# Patient Record
Sex: FEMALE | Race: WHITE | NOT HISPANIC OR LATINO | Employment: PART TIME | URBAN - METROPOLITAN AREA
[De-identification: names, ages, dates, MRNs, and addresses within clinical notes are randomized per-mention and may not be internally consistent; named-entity substitution may affect disease eponyms.]

---

## 2015-11-05 LAB
EXTERNAL HIV CONFIRMATION: NORMAL
EXTERNAL HIV SCREEN: NORMAL
HCV AB SER-ACNC: NEGATIVE

## 2017-10-21 ENCOUNTER — APPOINTMENT (OUTPATIENT)
Dept: URGENT CARE | Facility: MEDICAL CENTER | Age: 27
End: 2017-10-21

## 2017-10-21 ENCOUNTER — APPOINTMENT (OUTPATIENT)
Dept: LAB | Facility: MEDICAL CENTER | Age: 27
End: 2017-10-21
Attending: FAMILY MEDICINE

## 2017-10-21 ENCOUNTER — TRANSCRIBE ORDERS (OUTPATIENT)
Dept: URGENT CARE | Facility: MEDICAL CENTER | Age: 27
End: 2017-10-21

## 2017-10-21 DIAGNOSIS — Z02.1 PHYSICAL EXAM, PRE-EMPLOYMENT: ICD-10-CM

## 2017-10-21 DIAGNOSIS — Z02.1 PHYSICAL EXAM, PRE-EMPLOYMENT: Primary | ICD-10-CM

## 2017-10-21 LAB — RUBV IGG SERPL IA-ACNC: 83.3 IU/ML

## 2017-10-21 PROCEDURE — 36415 COLL VENOUS BLD VENIPUNCTURE: CPT

## 2017-10-21 PROCEDURE — 86480 TB TEST CELL IMMUN MEASURE: CPT

## 2017-10-21 PROCEDURE — 86762 RUBELLA ANTIBODY: CPT

## 2017-10-24 LAB
ANNOTATION COMMENT IMP: NORMAL
GAMMA INTERFERON BACKGROUND BLD IA-ACNC: 0.05 IU/ML
M TB IFN-G BLD-IMP: NEGATIVE
M TB IFN-G CD4+ BCKGRND COR BLD-ACNC: 0 IU/ML
M TB IFN-G CD4+ T-CELLS BLD-ACNC: 0.05 IU/ML
MITOGEN IGNF BLD-ACNC: >10 IU/ML
QUANTIFERON-TB GOLD IN TUBE: NORMAL
SERVICE CMNT-IMP: NORMAL

## 2018-07-03 ENCOUNTER — OFFICE VISIT (OUTPATIENT)
Dept: FAMILY MEDICINE | Facility: CLINIC | Age: 28
End: 2018-07-03
Payer: COMMERCIAL

## 2018-07-03 VITALS
SYSTOLIC BLOOD PRESSURE: 122 MMHG | WEIGHT: 157.2 LBS | BODY MASS INDEX: 28.93 KG/M2 | DIASTOLIC BLOOD PRESSURE: 81 MMHG | HEIGHT: 62 IN | TEMPERATURE: 97.9 F | HEART RATE: 86 BPM | OXYGEN SATURATION: 98 %

## 2018-07-03 DIAGNOSIS — R41.840 POOR CONCENTRATION: ICD-10-CM

## 2018-07-03 DIAGNOSIS — F41.9 ANXIETY AND DEPRESSION: Primary | ICD-10-CM

## 2018-07-03 DIAGNOSIS — F32.A ANXIETY AND DEPRESSION: Primary | ICD-10-CM

## 2018-07-03 PROCEDURE — 99202 OFFICE O/P NEW SF 15 MIN: CPT | Performed by: FAMILY MEDICINE

## 2018-07-03 RX ORDER — FLUOXETINE HYDROCHLORIDE 20 MG/1
40 CAPSULE ORAL
Qty: 180 CAPSULE | Refills: 1 | Status: SHIPPED | OUTPATIENT
Start: 2018-07-03 | End: 2019-02-06 | Stop reason: SDUPTHER

## 2018-07-03 RX ORDER — FLUOXETINE HYDROCHLORIDE 20 MG/1
40 CAPSULE ORAL
Refills: 0 | COMMUNITY
Start: 2018-04-04 | End: 2018-07-03 | Stop reason: SDUPTHER

## 2018-07-03 ASSESSMENT — ENCOUNTER SYMPTOMS
DIARRHEA: 0
NAUSEA: 0
PALPITATIONS: 0
VOMITING: 0
DYSPHORIC MOOD: 1
NERVOUS/ANXIOUS: 1
SHORTNESS OF BREATH: 0
ABDOMINAL PAIN: 0

## 2018-07-03 NOTE — PROGRESS NOTES
"   [place] [id number]  [phone]       Reason for visit:   Chief Complaint   Patient presents with   • discuss medications      HPI   Pretty Wood is a 27 y.o. female who presents with new patient med questions   - Anxiety/Depression  Takes Prozac mostly for depression  X 4 years  Has been taking Prozac 40 3-4 months ago  Overall feels OK  Lasts 2 months \"have been a little rough\"  Has never been on any other meds in the past  Had been seeing Psychologist before she moved 4 months ago  Denies any side effects  - ADHD ?s  Notes trouble focusing  Trouble getting things done  Contributing to her mental health  Started a new job within the last 3 mo - taking work home with her     Past Medical History:   Diagnosis Date   • Depression    • Hypothyroidism      Past Surgical History:   Procedure Laterality Date   • OOPHORECTOMY Left    • WISDOM TOOTH EXTRACTION       Social History     Social History   • Marital status: Unknown     Spouse name: N/A   • Number of children: N/A   • Years of education: N/A     Occupational History   • Not on file.     Social History Main Topics   • Smoking status: Never Smoker   • Smokeless tobacco: Never Used   • Alcohol use Yes      Comment: socially   • Drug use: Unknown   • Sexual activity: Not on file     Other Topics Concern   • Not on file     Social History Narrative    Do you wear your seatbelt? Yes    Do you have smoke detector in your home? Yes    Do you have a carbon monoxide detector in your home? 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 single         Family History   Problem Relation Age of Onset   • Hypertension Mother      Bactrim [sulfamethoxazole-trimethoprim]  Current Outpatient Prescriptions   Medication Sig Dispense Refill   • FLUoxetine (PROzac) 20 mg capsule Take 2 capsules (40 mg total) by mouth once daily. 180 capsule 1     No current facility-administered " medications for this visit.        Review of Systems   Respiratory: Negative for shortness of breath.    Cardiovascular: Negative for chest pain and palpitations.   Gastrointestinal: Negative for abdominal pain, diarrhea, nausea and vomiting.   Psychiatric/Behavioral: Positive for dysphoric mood. The patient is nervous/anxious.      Objective   Vitals:    07/03/18 0951   BP: 122/81   Pulse: 86   Temp: 36.6 °C (97.9 °F)   SpO2: 98%       Physical Exam   Constitutional: She is oriented to person, place, and time. She appears well-developed and well-nourished.   Cardiovascular: Normal rate, regular rhythm, normal heart sounds and intact distal pulses.  Exam reveals no gallop and no friction rub.    No murmur heard.  Pulmonary/Chest: Effort normal and breath sounds normal. No respiratory distress. She has no wheezes. She has no rales. She exhibits no tenderness.   Musculoskeletal: She exhibits no tenderness.   Neurological: She is alert and oriented to person, place, and time.   Vitals reviewed.      Procedures    No results found for: WBC, HGB, HCT, PLT, CHOL, TRIG, HDL, LDLDIRECT, ALT, AST, NA, K, CL, CREATININE, BUN, CO2, TSH, PSA, INR, HGBA1C, MICROALBUR      Assessment   Problem List Items Addressed This Visit     Anxiety and depression - Primary     New Problem  X years  Has been on Prozac for the last 4 years  Dose increased 3 months ago  Doing well  Concentration and completing tasks concerns her  No side effects  Continue with Prozac         Relevant Medications    FLUoxetine (PROzac) 20 mg capsule      Other Visit Diagnoses     Poor concentration        New Problem  x few months  New job  Trouble completing tasks  Trouble getting things done before end of day  Would like ADD/ADHD eval  Ref to Tricia Raya MD  7/3/2018

## 2018-07-03 NOTE — ASSESSMENT & PLAN NOTE
New Problem  X years  Has been on Prozac for the last 4 years  Dose increased 3 months ago  Doing well  Concentration and completing tasks concerns her  No side effects  Continue with Prozac

## 2018-07-03 NOTE — PATIENT INSTRUCTIONS
Med refilled and sent to you pharmacy  Schedule appt with Dr Ellison or her associate for evaluation  Come back in the next month for a physical

## 2018-07-03 NOTE — PROGRESS NOTES
[place] [address]  [phone]       Reason for visit:   Chief Complaint   Patient presents with   • discuss medications      HPI   Pretty Wood is a 27 y.o. female who presents with CPX   Updates {YES/NO:200010}    Diet -   Exercise -     Smoke {YES/NO:200010}  Alcohol {YES/NO:200010}  Drugs {YES/NO:200010}    Lives with   Work  Pap Smear Due {YES/NO:200010}    Hep A Due {YES/NO:200010}  TDAP Due {YES/NO:200010}  Flu Due {YES/NO:200010}  Lipids Due {YES/NO:200010}  STDs Due {YES/NO:200010}   History reviewed. No pertinent past medical history.  History reviewed. No pertinent surgical history.  Social History     Social History   • Marital status: Unknown     Spouse name: N/A   • Number of children: N/A   • Years of education: N/A     Occupational History   • Not on file.     Social History Main Topics   • Smoking status: Not on file   • Smokeless tobacco: Not on file   • Alcohol use Not on file   • Drug use: Unknown   • Sexual activity: Not on file     Other Topics Concern   • Not on file     Social History Narrative   • No narrative on file     History reviewed. No pertinent family history.  Bactrim [sulfamethoxazole-trimethoprim]  Current Outpatient Prescriptions   Medication Sig Dispense Refill   • FLUoxetine (PROzac) 20 mg capsule Take 40 mg by mouth once daily.  0     No current facility-administered medications for this visit.        Review of Systems  Objective   Vitals:    07/03/18 0951   BP: 122/81   Pulse: 86   Temp: 36.6 °C (97.9 °F)   SpO2: 98%       Physical Exam    Procedures    No results found for: WBC, HGB, HCT, PLT, CHOL, TRIG, HDL, LDLDIRECT, ALT, AST, NA, K, CL, CREATININE, BUN, CO2, TSH, PSA, INR, HGBA1C, MICROALBUR      Assessment   Problem List Items Addressed This Visit     None              Heladio Raya MD  7/3/2018

## 2018-08-07 ENCOUNTER — OFFICE VISIT (OUTPATIENT)
Dept: FAMILY MEDICINE | Facility: CLINIC | Age: 28
End: 2018-08-07
Payer: COMMERCIAL

## 2018-08-07 VITALS
SYSTOLIC BLOOD PRESSURE: 114 MMHG | OXYGEN SATURATION: 99 % | BODY MASS INDEX: 28.16 KG/M2 | HEART RATE: 91 BPM | WEIGHT: 153 LBS | DIASTOLIC BLOOD PRESSURE: 86 MMHG | TEMPERATURE: 98.3 F | HEIGHT: 62 IN

## 2018-08-07 DIAGNOSIS — Z00.00 ENCOUNTER FOR GENERAL ADULT MEDICAL EXAMINATION WITHOUT ABNORMAL FINDINGS: Primary | ICD-10-CM

## 2018-08-07 DIAGNOSIS — E03.9 HYPOTHYROIDISM, UNSPECIFIED TYPE: ICD-10-CM

## 2018-08-07 PROBLEM — F32.A DEPRESSION: Status: ACTIVE | Noted: 2017-04-11

## 2018-08-07 PROBLEM — F41.9 ANXIETY: Status: ACTIVE | Noted: 2017-04-11

## 2018-08-07 PROBLEM — N83.209 CYST OF OVARY: Status: ACTIVE | Noted: 2018-08-07

## 2018-08-07 PROBLEM — F41.9 ANXIETY: Status: RESOLVED | Noted: 2017-04-11 | Resolved: 2018-08-07

## 2018-08-07 PROBLEM — R68.82 REDUCED LIBIDO: Status: ACTIVE | Noted: 2018-08-07

## 2018-08-07 PROBLEM — F32.A DEPRESSION: Status: RESOLVED | Noted: 2017-04-11 | Resolved: 2018-08-07

## 2018-08-07 PROBLEM — R68.82 REDUCED LIBIDO: Status: RESOLVED | Noted: 2018-08-07 | Resolved: 2018-08-07

## 2018-08-07 PROBLEM — J45.909 ASTHMA: Status: ACTIVE | Noted: 2017-04-11

## 2018-08-07 PROCEDURE — 90632 HEPA VACCINE ADULT IM: CPT | Performed by: FAMILY MEDICINE

## 2018-08-07 PROCEDURE — 99395 PREV VISIT EST AGE 18-39: CPT | Mod: 25 | Performed by: FAMILY MEDICINE

## 2018-08-07 PROCEDURE — 36415 COLL VENOUS BLD VENIPUNCTURE: CPT | Performed by: FAMILY MEDICINE

## 2018-08-07 PROCEDURE — 90471 IMMUNIZATION ADMIN: CPT | Performed by: FAMILY MEDICINE

## 2018-08-07 RX ORDER — DEXTROAMPHETAMINE SULFATE, DEXTROAMPHETAMINE SACCHARATE, AMPHETAMINE SULFATE AND AMPHETAMINE ASPARTATE 2.5; 2.5; 2.5; 2.5 MG/1; MG/1; MG/1; MG/1
CAPSULE, EXTENDED RELEASE ORAL
Refills: 0 | COMMUNITY
Start: 2018-07-17 | End: 2018-08-07

## 2018-08-07 RX ORDER — ETONOGESTREL AND ETHINYL ESTRADIOL VAGINAL RING .015; .12 MG/D; MG/D
RING VAGINAL
COMMUNITY
End: 2018-08-07

## 2018-08-07 RX ORDER — LISDEXAMFETAMINE DIMESYLATE 30 MG/1
30 CAPSULE ORAL EVERY MORNING
Refills: 0 | COMMUNITY
Start: 2018-07-09

## 2018-08-07 ASSESSMENT — ENCOUNTER SYMPTOMS
ARTHRALGIAS: 0
FEVER: 0
PALPITATIONS: 0
SHORTNESS OF BREATH: 0
RHINORRHEA: 0
CHILLS: 0
NUMBNESS: 0
VOMITING: 0
DIZZINESS: 0
NAUSEA: 0
WEAKNESS: 0
BACK PAIN: 0
COUGH: 0
SORE THROAT: 0
ABDOMINAL PAIN: 0
CONSTIPATION: 0
DIARRHEA: 0

## 2018-08-07 NOTE — PATIENT INSTRUCTIONS
Diet - Try to limit portion sizes, take out, fast food, processed foods. Alcohol can be a expensive source of calories! If these are current problems for you, pick one area and focus on that first!    Exercise - Goal should be 30-40 minutes, 3-4 times a week. If you are currently not exercising, set reachable goals with short term deadlines!    Preventative Care Due - Schedule appt with GYN    Labs Due Today - Yes    Shots Due Today - Hep A #1    Follow up - 1 year or sooner if anything comes up    Make sure you are seeing your specialists, Eye Doctor, Foot Doctor, OBGYN yearly!

## 2018-08-08 LAB
ALBUMIN SERPL-MCNC: 4.6 G/DL (ref 3.5–5.5)
ALBUMIN/GLOB SERPL: 2.3 {RATIO} (ref 1.2–2.2)
ALP SERPL-CCNC: 67 IU/L (ref 39–117)
ALT SERPL-CCNC: 13 IU/L (ref 0–32)
AST SERPL-CCNC: 16 IU/L (ref 0–40)
BASOPHILS # BLD AUTO: 0 X10E3/UL (ref 0–0.2)
BASOPHILS NFR BLD AUTO: 1 %
BILIRUB SERPL-MCNC: <0.2 MG/DL (ref 0–1.2)
BUN SERPL-MCNC: 15 MG/DL (ref 6–20)
BUN/CREAT SERPL: 17 (ref 9–23)
CALCIUM SERPL-MCNC: 9.4 MG/DL (ref 8.7–10.2)
CHLORIDE SERPL-SCNC: 103 MMOL/L (ref 96–106)
CHOLEST SERPL-MCNC: 204 MG/DL (ref 100–199)
CO2 SERPL-SCNC: 24 MMOL/L (ref 20–29)
CREAT SERPL-MCNC: 0.9 MG/DL (ref 0.57–1)
EOSINOPHIL # BLD AUTO: 0.1 X10E3/UL (ref 0–0.4)
EOSINOPHIL NFR BLD AUTO: 2 %
ERYTHROCYTE [DISTWIDTH] IN BLOOD BY AUTOMATED COUNT: 13 % (ref 12.3–15.4)
GLOBULIN SER CALC-MCNC: 2 G/DL (ref 1.5–4.5)
GLUCOSE SERPL-MCNC: 95 MG/DL (ref 65–99)
HCT VFR BLD AUTO: 41.9 % (ref 34–46.6)
HDLC SERPL-MCNC: 61 MG/DL
HGB BLD-MCNC: 13.9 G/DL (ref 11.1–15.9)
HIV 1+2 AB+HIV1 P24 AG SERPL QL IA: NON REACTIVE
IMM GRANULOCYTES # BLD: 0 X10E3/UL (ref 0–0.1)
IMM GRANULOCYTES NFR BLD: 0 %
LAB CORP EGFR IF AFRICN AM: 99 ML/MIN/1.73
LAB CORP EGFR IF NONAFRICN AM: 86 ML/MIN/1.73
LDLC SERPL CALC-MCNC: 120 MG/DL (ref 0–99)
LYMPHOCYTES # BLD AUTO: 1.4 X10E3/UL (ref 0.7–3.1)
LYMPHOCYTES NFR BLD AUTO: 32 %
MCH RBC QN AUTO: 29.1 PG (ref 26.6–33)
MCHC RBC AUTO-ENTMCNC: 33.2 G/DL (ref 31.5–35.7)
MCV RBC AUTO: 88 FL (ref 79–97)
MONOCYTES # BLD AUTO: 0.4 X10E3/UL (ref 0.1–0.9)
MONOCYTES NFR BLD AUTO: 9 %
NEUTROPHILS # BLD AUTO: 2.4 X10E3/UL (ref 1.4–7)
NEUTROPHILS NFR BLD AUTO: 56 %
PLATELET # BLD AUTO: 208 X10E3/UL (ref 150–379)
POTASSIUM SERPL-SCNC: 5.1 MMOL/L (ref 3.5–5.2)
PROT SERPL-MCNC: 6.6 G/DL (ref 6–8.5)
RBC # BLD AUTO: 4.78 X10E6/UL (ref 3.77–5.28)
RPR SER QL: NON REACTIVE
SODIUM SERPL-SCNC: 141 MMOL/L (ref 134–144)
T4 FREE SERPL-MCNC: 1.1 NG/DL (ref 0.82–1.77)
TRIGL SERPL-MCNC: 114 MG/DL (ref 0–149)
TSH SERPL DL<=0.005 MIU/L-ACNC: 3.07 UIU/ML (ref 0.45–4.5)
VLDLC SERPL CALC-MCNC: 23 MG/DL (ref 5–40)
WBC # BLD AUTO: 4.3 X10E3/UL (ref 3.4–10.8)

## 2019-02-07 RX ORDER — FLUOXETINE HYDROCHLORIDE 20 MG/1
40 CAPSULE ORAL
Qty: 180 CAPSULE | Refills: 1 | Status: SHIPPED | OUTPATIENT
Start: 2019-02-07

## 2019-08-28 ENCOUNTER — TELEPHONE (OUTPATIENT)
Dept: FAMILY MEDICINE | Facility: CLINIC | Age: 29
End: 2019-08-28

## 2021-01-08 ENCOUNTER — IMMUNIZATIONS (OUTPATIENT)
Dept: FAMILY MEDICINE CLINIC | Facility: HOSPITAL | Age: 31
End: 2021-01-08

## 2021-01-08 DIAGNOSIS — Z23 ENCOUNTER FOR IMMUNIZATION: ICD-10-CM

## 2021-01-08 PROCEDURE — 91301 SARS-COV-2 / COVID-19 MRNA VACCINE (MODERNA) 100 MCG: CPT

## 2021-01-08 PROCEDURE — 0011A SARS-COV-2 / COVID-19 MRNA VACCINE (MODERNA) 100 MCG: CPT

## 2021-02-05 ENCOUNTER — IMMUNIZATIONS (OUTPATIENT)
Dept: FAMILY MEDICINE CLINIC | Facility: HOSPITAL | Age: 31
End: 2021-02-05

## 2021-02-05 DIAGNOSIS — Z23 ENCOUNTER FOR IMMUNIZATION: Primary | ICD-10-CM

## 2021-02-05 PROCEDURE — 91301 SARS-COV-2 / COVID-19 MRNA VACCINE (MODERNA) 100 MCG: CPT

## 2021-02-05 PROCEDURE — 0012A SARS-COV-2 / COVID-19 MRNA VACCINE (MODERNA) 100 MCG: CPT

## 2021-08-03 ENCOUNTER — OFFICE VISIT (OUTPATIENT)
Dept: FAMILY MEDICINE CLINIC | Facility: CLINIC | Age: 31
End: 2021-08-03
Payer: COMMERCIAL

## 2021-08-03 VITALS
HEART RATE: 85 BPM | BODY MASS INDEX: 30.02 KG/M2 | DIASTOLIC BLOOD PRESSURE: 80 MMHG | TEMPERATURE: 96.2 F | SYSTOLIC BLOOD PRESSURE: 120 MMHG | RESPIRATION RATE: 12 BRPM | WEIGHT: 169.4 LBS | OXYGEN SATURATION: 100 % | HEIGHT: 63 IN

## 2021-08-03 DIAGNOSIS — Z13.6 SCREENING FOR CARDIOVASCULAR CONDITION: ICD-10-CM

## 2021-08-03 DIAGNOSIS — E55.9 VITAMIN D DEFICIENCY: ICD-10-CM

## 2021-08-03 DIAGNOSIS — E53.8 VITAMIN B12 DEFICIENCY: ICD-10-CM

## 2021-08-03 DIAGNOSIS — R53.83 FATIGUE, UNSPECIFIED TYPE: ICD-10-CM

## 2021-08-03 DIAGNOSIS — F98.8 ATTENTION DEFICIT DISORDER (ADD) WITHOUT HYPERACTIVITY: Primary | ICD-10-CM

## 2021-08-03 DIAGNOSIS — Z13.1 SCREENING FOR DIABETES MELLITUS: ICD-10-CM

## 2021-08-03 PROCEDURE — 3725F SCREEN DEPRESSION PERFORMED: CPT | Performed by: FAMILY MEDICINE

## 2021-08-03 PROCEDURE — 3008F BODY MASS INDEX DOCD: CPT | Performed by: FAMILY MEDICINE

## 2021-08-03 PROCEDURE — 1036F TOBACCO NON-USER: CPT | Performed by: FAMILY MEDICINE

## 2021-08-03 PROCEDURE — 99203 OFFICE O/P NEW LOW 30 MIN: CPT | Performed by: FAMILY MEDICINE

## 2021-08-03 RX ORDER — ALPRAZOLAM 0.25 MG/1
TABLET ORAL AS NEEDED
COMMUNITY

## 2021-08-03 RX ORDER — METHYLPHENIDATE HYDROCHLORIDE 5 MG/1
5 TABLET ORAL AS NEEDED
COMMUNITY

## 2021-08-03 RX ORDER — ALBUTEROL SULFATE 90 UG/1
2 AEROSOL, METERED RESPIRATORY (INHALATION) EVERY 6 HOURS PRN
COMMUNITY
Start: 2021-05-25 | End: 2022-05-25

## 2021-08-03 RX ORDER — METHYLPHENIDATE HYDROCHLORIDE 10 MG/1
10 TABLET ORAL AS NEEDED
COMMUNITY

## 2021-08-03 NOTE — PROGRESS NOTES
FAMILY PRACTICE OFFICE VISIT       NAME: Iraida Dean  AGE: 33 y o  SEX: female       : 1990        MRN: [id number]    DATE: 8/3/2021  TIME: 6:00 PM    Assessment and Plan   1  Attention deficit disorder (ADD) without hyperactivity  Comments:  Pt stable - likely dx here; possibly some underlying XENIA  Will check a full battery of labs, with TSH/CBC included with her fatigue  2  Fatigue, unspecified type  -     EBV acute panel; Future  -     CBC and differential; Future  -     TSH, 3rd generation with Free T4 reflex; Future    3  Screening for diabetes mellitus  -     Comprehensive metabolic panel; Future    4  Screening for cardiovascular condition  -     Lipid Panel with Direct LDL reflex; Future    5  Vitamin D deficiency  Comments:  Hx of; checking Vit D level  Orders:  -     Vitamin D 25 hydroxy; Future    6  Vitamin B12 deficiency  Comments:  Hx of; checking Vit B12 level  Orders:  -     Vitamin B12; Future         There are no Patient Instructions on file for this visit  Chief Complaint     Chief Complaint   Patient presents with    Physical Exam     patient being seen for physical - new patient        History of Present Illness   Iraida Dean is a 33y o -year-old female who is new to the office today; pt works per [employer] inpatient for [employer] [employer] [employer]  Hx of ADD, situational anxiety (worse in the last year; improved when she left another stressful working cessation), mild intermittent asthma  Has never really felt that Methylphenidate helped her all that much - has been taking for approx 1 year  Causes her some anxiety  Never did well with extended meds, other than Vyvanse, but crashed when the med wore off  Chronic fatigue  Remotely treated for what sounds as if it was an episode of thyroiditis - was eventually taken off of Levothyroxine  [place] PDMP was checked today  Review of Systems   Review of Systems   Constitutional: Positive for fatigue   Negative for activity change and unexpected weight change  Respiratory: Negative for shortness of breath  Cardiovascular: Negative for chest pain  Gastrointestinal: Negative for blood in stool  Endocrine:        No hair loss  Genitourinary: Negative for menstrual problem  No heavy menses flow  Psychiatric/Behavioral:        Frustration with not responding to meds, the loss of her mother last year, etc        Active Problem List   There is no problem list on file for this patient  Past Medical History:  History reviewed  No pertinent past medical history  Past Surgical History:  Past Surgical History:   Procedure Laterality Date    OTHER SURGICAL HISTORY      Oopherectomy       Family History:  History reviewed  No pertinent family history  Social History:  Social History     Socioeconomic History    Marital status: Single     Spouse name: Not on file    Number of children: Not on file    Years of education: Not on file    Highest education level: Not on file   Occupational History    Not on file   Tobacco Use    Smoking status: Never Smoker    Smokeless tobacco: Never Used   Substance and Sexual Activity    Alcohol use: Yes    Drug use: Not Currently    Sexual activity: Not on file   Other Topics Concern    Not on file   Social History Narrative    Not on file     Social Determinants of Health     Financial Resource Strain:     Difficulty of Paying Living Expenses:    Food Insecurity:     Worried About Running Out of Food in the Last Year:     [address] in the Last Year:    Transportation Needs:     Lack of Transportation (Medical):      Lack of Transportation (Non-Medical):    Physical Activity:     Days of Exercise per Week:     Minutes of Exercise per Session:    Stress:     Feeling of Stress :    Social Connections:     Frequency of Communication with Friends and Family:     Frequency of Social Gatherings with Friends and Family:     Attends Judaism Services:     Active Member of Clubs or Organizations:     Attends Club or Organization Meetings:     Marital Status:    Intimate Partner Violence:     Fear of Current or Ex-Partner:     Emotionally Abused:     Physically Abused:     Sexually Abused:        Objective     Vitals:    08/03/21 1257   BP: 120/80   Pulse: 85   Resp: 12   Temp: (!) 96 2 °F (35 7 °C)   SpO2: 100%     Wt Readings from Last 3 Encounters:   08/03/21 76 8 kg (169 lb 6 4 oz)       Physical Exam  Vitals and nursing note reviewed  Constitutional:       General: She is not in acute distress  Appearance: Normal appearance  She is not ill-appearing, toxic-appearing or diaphoretic  HENT:      Head: Normocephalic and atraumatic  Eyes:      General: No scleral icterus  Conjunctiva/sclera: Conjunctivae normal    Cardiovascular:      Rate and Rhythm: Normal rate and regular rhythm  Heart sounds: Normal heart sounds  No murmur heard  No friction rub  No gallop  Pulmonary:      Effort: Pulmonary effort is normal  No respiratory distress  Breath sounds: Normal breath sounds  No stridor  No wheezing, rhonchi or rales  Abdominal:      General: Abdomen is flat  Bowel sounds are normal  There is no distension  Palpations: Abdomen is soft  There is no mass  Tenderness: There is no abdominal tenderness  There is no guarding or rebound  Musculoskeletal:      Cervical back: Normal range of motion and neck supple  No rigidity or tenderness  Lymphadenopathy:      Cervical: No cervical adenopathy  Neurological:      Mental Status: She is alert and oriented to person, place, and time  Psychiatric:         Mood and Affect: Mood normal          Behavior: Behavior normal          Thought Content: Thought content normal          Judgment: Judgment normal       Comments: Pt a little tearful, when speaking about her mother's passing last year           Pertinent Laboratory/Diagnostic Studies:  No results found for: GLUCOSE, BUN, CREATININE, CALCIUM, NA, K, CO2, CL  No results found for: ALT, AST, GGT, ALKPHOS, BILITOT    No results found for: WBC, HGB, HCT, MCV, PLT    No results found for: TSH    No results found for: CHOL  No results found for: TRIG  No results found for: HDL  No results found for: LDLCALC  No results found for: HGBA1C    Results for orders placed or performed in visit on 10/21/17   Quantiferon TB Gold   Result Value Ref Range    QuantiFERON-TB Gold In Tube Specimen incubated at [place], [place]  Rubella antibody, IgG   Result Value Ref Range    Rubella IgG Quant 83 3 >9 9 IU/mL   QuantiFERON TB In Tube-Reflex   Result Value Ref Range    QuantiFERON TB Gold Negative Negative    QuantiFERON Criteria Comment     QuantiFERON TB Ag Value 0 05 IU/mL    QuantiFERON Nil Value 0 05 IU/mL    QuantiFERON Mitogen value >10 00 IU/mL    QFT TB Ag minus Nil Value 0 00 IU/mL    QFT Interpretation Comment        Orders Placed This Encounter   Procedures    Comprehensive metabolic panel    Lipid Panel with Direct LDL reflex    EBV acute panel    CBC and differential    TSH, 3rd generation with Free T4 reflex    Vitamin D 25 hydroxy    Vitamin B12       ALLERGIES:  Allergies   Allergen Reactions    Sulfamethoxazole-Trimethoprim Anaphylaxis     Other reaction(s): Facial Swelling         Current Medications     Current Outpatient Medications   Medication Sig Dispense Refill    albuterol (PROVENTIL HFA,VENTOLIN HFA) 90 mcg/act inhaler Inhale 2 puffs every 6 (six) hours as needed      ALPRAZolam (XANAX) 0 25 mg tablet Take by mouth as needed for anxiety      methylphenidate (RITALIN) 10 mg tablet Take 10 mg by mouth as needed      methylphenidate (RITALIN) 5 mg tablet Take 5 mg by mouth as needed       No current facility-administered medications for this visit           Health Maintenance     Health Maintenance   Topic Date Due    Hepatitis C Screening  Never done    HIV Screening  Never done    Annual Physical  Never done    Cervical Cancer Screening Never done    Influenza Vaccine (1) 09/01/2021    Depression Screening PHQ  08/03/2022    BMI: Followup Plan  08/03/2022    BMI: Adult  08/03/2022    DTaP,Tdap,and Td Vaccines (2 - Td or Tdap) 10/16/2025    Meningococcal ACWY Vaccine  Completed    COVID-19 Vaccine  Completed    Pneumococcal Vaccine: Pediatrics (0 to 5 Years) and At-Risk Patients (6 to 59 Years)  Aged Out    HIB Vaccine  Aged Out    Hepatitis B Vaccine  Aged Out    IPV Vaccine  Aged Out    Hepatitis A Vaccine  Aged Out    HPV Vaccine  Aged [employer] History   Administered Date(s) Administered    Hep A, adult 08/07/2018    INFLUENZA 10/23/2017    Influenza Injectable, MDCK, Preservative Free, Quadrivalent, 0 5 mL 10/08/2019    Influenza Quadrivalent Preservative Free 3 years and older IM 10/16/2015, 11/21/2016, 10/13/2020    Meningococcal MCV4P 08/21/2006    SARS-CoV-2 / COVID-19 mRNA IM (Moderna) 01/08/2021, 02/05/2021    Tdap 10/16/2015    Tuberculin Skin Test-PPD Intradermal 11/17/2015, 11/21/2016          Ga Maddox DO

## 2021-08-03 NOTE — PROGRESS NOTES
BMI Counseling: Body mass index is 29 9 kg/m²  The BMI is above normal  Nutrition recommendations include moderation in carbohydrate intake  Exercise recommendations include exercising 3-5 times per week

## 2021-09-08 ENCOUNTER — OFFICE VISIT (OUTPATIENT)
Dept: FAMILY MEDICINE CLINIC | Facility: CLINIC | Age: 31
End: 2021-09-08
Payer: COMMERCIAL

## 2021-09-08 VITALS
BODY MASS INDEX: 29.7 KG/M2 | HEART RATE: 78 BPM | SYSTOLIC BLOOD PRESSURE: 120 MMHG | OXYGEN SATURATION: 95 % | WEIGHT: 167.6 LBS | DIASTOLIC BLOOD PRESSURE: 84 MMHG | TEMPERATURE: 97.1 F | RESPIRATION RATE: 12 BRPM | HEIGHT: 63 IN

## 2021-09-08 DIAGNOSIS — F90.0 ATTENTION DEFICIT HYPERACTIVITY DISORDER (ADHD), PREDOMINANTLY INATTENTIVE TYPE: Primary | ICD-10-CM

## 2021-09-08 PROCEDURE — 1036F TOBACCO NON-USER: CPT | Performed by: FAMILY MEDICINE

## 2021-09-08 PROCEDURE — 99213 OFFICE O/P EST LOW 20 MIN: CPT | Performed by: FAMILY MEDICINE

## 2021-09-08 PROCEDURE — 3008F BODY MASS INDEX DOCD: CPT | Performed by: FAMILY MEDICINE

## 2021-09-08 NOTE — PROGRESS NOTES
FAMILY PRACTICE OFFICE VISIT       NAME: Vonda Li  AGE: 33 y o  SEX: female       : 1990        MRN: [id number]    DATE: 2021  TIME: 7:49 PM    Assessment and Plan   1  Attention deficit hyperactivity disorder (ADHD), predominantly inattentive type  Comments:  Pt stable on exam - no changes to meds right now  [place] PDMP was [place]  Pt to continue a healthy, lower carb/saturated fat diet, & regular exercise  There are no Patient Instructions on file for this visit  Chief Complaint     Chief Complaint   Patient presents with    Follow-up     patient being seen for 6 week follow up       History of Present Illness   Vonda Li is a 33y o -year-old female who presents in f/u - , Vit D 23 (CBC, TSH, EBV Panel were normal)  Only med that she responded well to in the past was Vyvanse  Combination that she is on right now, at least keeps her generally focused, and she would like to continue  Review of Systems   Review of Systems   Constitutional: Negative for activity change  Respiratory: Negative for shortness of breath  Cardiovascular: Negative for chest pain  Psychiatric/Behavioral: Negative for decreased concentration  Active Problem List   There is no problem list on file for this patient  Past Medical History:  History reviewed  No pertinent past medical history  Past Surgical History:  Past Surgical History:   Procedure Laterality Date    OTHER SURGICAL HISTORY      Oopherectomy       Family History:  History reviewed  No pertinent family history  Social History:  Social History     Socioeconomic History    Marital status: Single     Spouse name: Not on file    Number of children: Not on file    Years of education: Not on file    Highest education level: Not on file   Occupational History    Not on file   Tobacco Use    Smoking status: Never Smoker    Smokeless tobacco: Never Used   Substance and Sexual Activity    Alcohol use:  Yes    Drug use: Not Currently    Sexual activity: Not on file   Other Topics Concern    Not on file   Social History Narrative    Not on file     Social Determinants of Health     Financial Resource Strain:     Difficulty of Paying Living Expenses:    Food Insecurity:     Worried About Running Out of Food in the Last Year:     [address] in the Last Year:    Transportation Needs:     Lack of Transportation (Medical):  Lack of Transportation (Non-Medical):    Physical Activity:     Days of Exercise per Week:     Minutes of Exercise per Session:    Stress:     Feeling of Stress :    Social Connections:     Frequency of Communication with Friends and Family:     Frequency of Social Gatherings with Friends and Family:     Attends Voodoo Services:     Active Member of Clubs or Organizations:     Attends Club or Organization Meetings:     Marital Status:    Intimate Partner Violence:     Fear of Current or Ex-Partner:     Emotionally Abused:     Physically Abused:     Sexually Abused:        Objective     Vitals:    09/08/21 0951   BP: 120/84   Pulse: 78   Resp: 12   Temp: (!) 97 1 °F (36 2 °C)   SpO2: 95%     Wt Readings from Last 3 Encounters:   09/08/21 76 kg (167 lb 9 6 oz)   08/03/21 76 8 kg (169 lb 6 4 oz)       Physical Exam  Vitals and nursing note reviewed  Constitutional:       General: She is not in acute distress  Appearance: Normal appearance  She is not ill-appearing, toxic-appearing or diaphoretic  HENT:      Head: Normocephalic and atraumatic  Eyes:      General: No scleral icterus  Conjunctiva/sclera: Conjunctivae normal    Cardiovascular:      Rate and Rhythm: Normal rate and regular rhythm  Heart sounds: Normal heart sounds  No murmur heard  No friction rub  No gallop  Pulmonary:      Effort: Pulmonary effort is normal  No respiratory distress  Breath sounds: Normal breath sounds  No stridor  No wheezing, rhonchi or rales     Musculoskeletal: Cervical back: Normal range of motion and neck supple  No rigidity or tenderness  Lymphadenopathy:      Cervical: No cervical adenopathy  Neurological:      Mental Status: She is alert and oriented to person, place, and time  Psychiatric:         Mood and Affect: Mood normal          Behavior: Behavior normal          Thought Content: Thought content normal          Judgment: Judgment normal          Pertinent Laboratory/Diagnostic Studies:  No results found for: GLUCOSE, BUN, CREATININE, CALCIUM, NA, K, CO2, CL  No results found for: ALT, AST, GGT, ALKPHOS, BILITOT    No results found for: WBC, HGB, HCT, MCV, PLT    No results found for: TSH    No results found for: CHOL  No results found for: TRIG  No results found for: HDL  No results found for: LDLCALC  No results found for: HGBA1C    Results for orders placed or performed in visit on 10/21/17   Quantiferon TB Gold   Result Value Ref Range    QuantiFERON-TB Gold In Tube Specimen incubated at [facility], [address] [place] [address]  Rubella antibody, IgG   Result Value Ref Range    Rubella IgG Quant 83 3 >9 9 IU/mL   QuantiFERON TB In Tube-Reflex   Result Value Ref Range    QuantiFERON TB Gold Negative Negative    QuantiFERON Criteria Comment     QuantiFERON TB Ag Value 0 05 IU/mL    QuantiFERON Nil Value 0 05 IU/mL    QuantiFERON Mitogen value >10 00 IU/mL    QFT TB Ag minus Nil Value 0 00 IU/mL    QFT Interpretation Comment        No orders of the defined types were placed in this encounter        ALLERGIES:  Allergies   Allergen Reactions    Sulfamethoxazole-Trimethoprim Anaphylaxis     Other reaction(s): Facial Swelling         Current Medications     Current Outpatient Medications   Medication Sig Dispense Refill    albuterol (PROVENTIL HFA,VENTOLIN HFA) 90 mcg/act inhaler Inhale 2 puffs every 6 (six) hours as needed      ALPRAZolam (XANAX) 0 25 mg tablet Take by mouth as needed for anxiety      methylphenidate (RITALIN) 10 mg tablet Take 10 mg by mouth as needed  methylphenidate (RITALIN) 5 mg tablet Take 5 mg by mouth as needed       No current facility-administered medications for this visit           Health Maintenance     Health Maintenance   Topic Date Due    Hepatitis C Screening  Never done    HIV Screening  Never done    Annual Physical  Never done    Cervical Cancer Screening  Never done    Influenza Vaccine (1) 09/01/2021    Depression Screening PHQ  08/03/2022    BMI: Followup Plan  08/03/2022    BMI: Adult  09/08/2022    DTaP,Tdap,and Td Vaccines (2 - Td or Tdap) 10/16/2025    Meningococcal ACWY Vaccine  Completed    COVID-19 Vaccine  Completed    Pneumococcal Vaccine: Pediatrics (0 to 5 Years) and At-Risk Patients (6 to 59 Years)  Aged Out    HIB Vaccine  Aged Out    Hepatitis B Vaccine  Aged Out    IPV Vaccine  Aged Out    Hepatitis A Vaccine  Aged Out    HPV Vaccine  Aged [employer] History   Administered Date(s) Administered    Hep A, adult 08/07/2018    INFLUENZA 10/23/2017    Influenza Injectable, MDCK, Preservative Free, Quadrivalent, 0 5 mL 10/08/2019    Influenza Quadrivalent Preservative Free 3 years and older IM 10/16/2015, 11/21/2016, 10/13/2020    Meningococcal MCV4P 08/21/2006    SARS-CoV-2 / COVID-19 mRNA IM (Moderna) 01/08/2021, 02/05/2021    Tdap 10/16/2015    Tuberculin Skin Test-PPD Intradermal 11/17/2015, 11/21/2016          Ga Maddox DO

## 2021-10-12 ENCOUNTER — OFFICE VISIT (OUTPATIENT)
Dept: FAMILY MEDICINE CLINIC | Facility: CLINIC | Age: 31
End: 2021-10-12
Payer: COMMERCIAL

## 2021-10-12 VITALS
TEMPERATURE: 98.2 F | RESPIRATION RATE: 14 BRPM | HEIGHT: 63 IN | HEART RATE: 81 BPM | DIASTOLIC BLOOD PRESSURE: 90 MMHG | SYSTOLIC BLOOD PRESSURE: 150 MMHG | BODY MASS INDEX: 29.84 KG/M2 | WEIGHT: 168.4 LBS | OXYGEN SATURATION: 99 %

## 2021-10-12 DIAGNOSIS — F41.9 ANXIETY AND DEPRESSION: Primary | ICD-10-CM

## 2021-10-12 DIAGNOSIS — F32.A ANXIETY AND DEPRESSION: Primary | ICD-10-CM

## 2021-10-12 PROCEDURE — 1036F TOBACCO NON-USER: CPT | Performed by: FAMILY MEDICINE

## 2021-10-12 PROCEDURE — 99213 OFFICE O/P EST LOW 20 MIN: CPT | Performed by: FAMILY MEDICINE

## 2021-10-12 PROCEDURE — 3008F BODY MASS INDEX DOCD: CPT | Performed by: FAMILY MEDICINE

## 2021-10-12 RX ORDER — ESCITALOPRAM OXALATE 10 MG/1
10 TABLET ORAL DAILY
Qty: 30 TABLET | Refills: 5 | Status: SHIPPED | OUTPATIENT
Start: 2021-10-12 | End: 2022-04-18

## 2021-12-13 ENCOUNTER — TELEPHONE (OUTPATIENT)
Dept: DERMATOLOGY | Facility: CLINIC | Age: 31
End: 2021-12-13

## 2022-01-05 ENCOUNTER — OFFICE VISIT (OUTPATIENT)
Dept: FAMILY MEDICINE CLINIC | Facility: CLINIC | Age: 32
End: 2022-01-05
Payer: COMMERCIAL

## 2022-01-05 VITALS
WEIGHT: 172.4 LBS | DIASTOLIC BLOOD PRESSURE: 80 MMHG | TEMPERATURE: 96.6 F | HEART RATE: 80 BPM | RESPIRATION RATE: 14 BRPM | OXYGEN SATURATION: 99 % | SYSTOLIC BLOOD PRESSURE: 124 MMHG | BODY MASS INDEX: 30.55 KG/M2 | HEIGHT: 63 IN

## 2022-01-05 DIAGNOSIS — F41.9 ANXIETY AND DEPRESSION: ICD-10-CM

## 2022-01-05 DIAGNOSIS — F32.A ANXIETY AND DEPRESSION: ICD-10-CM

## 2022-01-05 DIAGNOSIS — Z00.00 ANNUAL PHYSICAL EXAM: Primary | ICD-10-CM

## 2022-01-05 PROCEDURE — 1036F TOBACCO NON-USER: CPT | Performed by: FAMILY MEDICINE

## 2022-01-05 PROCEDURE — 99395 PREV VISIT EST AGE 18-39: CPT | Performed by: FAMILY MEDICINE

## 2022-01-05 PROCEDURE — 3008F BODY MASS INDEX DOCD: CPT | Performed by: FAMILY MEDICINE

## 2022-01-05 NOTE — PROGRESS NOTES
[facility]    NAME: Deanne Narvaez  AGE: 33 y o  SEX: female  : 1990     DATE: 2022     Assessment and Plan:     Problem List Items Addressed This Visit     None      Visit Diagnoses     Annual physical exam    -  Primary    Lauri Regan is doing well  She is to continue a healthy, lower carb diet, and get back to regular exercise  Anxiety and depression        Doing well on Escitalopram - continues Therapy sessions as well  Immunizations and preventive care screenings were discussed with patient today  Appropriate education was printed on patient's after visit summary  Counseling:  Dental Health: discussed importance of regular tooth brushing, flossing, and dental visits  · Exercise: the importance of regular exercise/physical activity was discussed  Recommend exercise 3-5 times per week for at least 30 minutes  BMI Counseling: Body mass index is 30 43 kg/m²  The BMI is above normal  Nutrition recommendations include moderation in carbohydrate intake  Exercise recommendations include exercising 3-5 times per week  No pharmacotherapy was ordered  Patient referred to PCP  Rationale for BMI follow-up plan is due to patient being overweight or obese  Return in 6 months (on 2022) for Recheck  Chief Complaint:     Chief Complaint   Patient presents with    Physical Exam     patient being seen for physical       History of Present Illness:     Adult Annual Physical   Patient here for a comprehensive physical exam  The patient reports no problems  Lauri Regan is doing much better now on Escitalopram; no real side effects  No HI/SI  Attention improved  Still seeing her Therapist   Pt received her Moderna COV-19 vaccine booster, and her Flu Vaccine  Will be making an appt with her GYN - had last PAP smear approx 2 years ago  Diet and Physical Activity  · Diet/Nutrition: well balanced diet  · Exercise: no formal exercise  Depression Screening  PHQ-2/9 Depression Screening         General Health  · Sleep: sleeps well  · Hearing: normal - bilateral   · Vision: no vision problems  · Dental: regular dental visits  /GYN Health  · Last menstrual period: Menses regular generally  · Contraceptive method: None  · History of STDs?: no      Review of Systems:     Review of Systems   Constitutional: Negative for activity change  Respiratory: Negative for shortness of breath  Cardiovascular: Negative for chest pain  Gastrointestinal: Negative for abdominal pain  Mild GERD issues  Genitourinary: Negative for menstrual problem  No new breast lumps on self examination  Psychiatric/Behavioral: Negative for dysphoric mood and suicidal ideas  The patient is not nervous/anxious  Past Medical History:     History reviewed  No pertinent past medical history  Past Surgical History:     Past Surgical History:   Procedure Laterality Date    OTHER SURGICAL HISTORY      Oopherectomy      Social History:     Social History     Socioeconomic History    Marital status: Single     Spouse name: None    Number of children: None    Years of education: None    Highest education level: None   Occupational History    None   Tobacco Use    Smoking status: Never Smoker    Smokeless tobacco: Never Used   Substance and Sexual Activity    Alcohol use: Yes    Drug use: Not Currently    Sexual activity: None   Other Topics Concern    None   Social History Narrative    None     Social Determinants of Health     Financial Resource Strain: Not on file   Food Insecurity: Not on file   Transportation Needs: Not on file   Physical Activity: Not on file   Stress: Not on file   Social Connections: Not on file   Intimate Partner Violence: Not on file   Housing Stability: Not on file      Family History:     History reviewed  No pertinent family history     Current Medications:     Current Outpatient Medications   Medication Sig Dispense Refill    albuterol (PROVENTIL HFA,VENTOLIN HFA) 90 mcg/act inhaler Inhale 2 puffs every 6 (six) hours as needed      ALPRAZolam (XANAX) 0 25 mg tablet Take by mouth as needed for anxiety      escitalopram (Lexapro) 10 mg tablet Take 1 tablet (10 mg total) by mouth daily 30 tablet 5    methylphenidate (RITALIN) 10 mg tablet Take 10 mg by mouth as needed      methylphenidate (RITALIN) 5 mg tablet Take 5 mg by mouth as needed       No current facility-administered medications for this visit  Allergies: Allergies   Allergen Reactions    Sulfamethoxazole-Trimethoprim Anaphylaxis     Other reaction(s): Facial Swelling        Physical Exam:     /80 (BP Location: Left arm, Patient Position: Sitting, Cuff Size: Standard)   Pulse 80   Temp (!) 96 6 °F (35 9 °C) (Tympanic)   Resp 14   Ht 5' 3 11" (1 603 m)   Wt 78 2 kg (172 lb 6 4 oz)   SpO2 99%   BMI 30 43 kg/m²     Physical Exam  Vitals and nursing note reviewed  Constitutional:       General: She is not in acute distress  Appearance: Normal appearance  She is not ill-appearing, toxic-appearing or diaphoretic  HENT:      Head: Normocephalic and atraumatic  Eyes:      General: No scleral icterus  Conjunctiva/sclera: Conjunctivae normal    Cardiovascular:      Rate and Rhythm: Normal rate and regular rhythm  Heart sounds: Normal heart sounds  No murmur heard  No friction rub  No gallop  Pulmonary:      Effort: Pulmonary effort is normal  No respiratory distress  Breath sounds: Normal breath sounds  No stridor  No wheezing, rhonchi or rales  Abdominal:      General: Abdomen is flat  Bowel sounds are normal  There is no distension  Palpations: Abdomen is soft  There is no mass  Tenderness: There is no abdominal tenderness  There is no guarding or rebound  Musculoskeletal:      Cervical back: Normal range of motion and neck supple  No rigidity or tenderness  Lymphadenopathy:      Cervical: No cervical adenopathy  Neurological:      Mental Status: She is alert and oriented to person, place, and time  Psychiatric:         Mood and Affect: Mood normal          Behavior: Behavior normal          Thought Content: Thought content normal          Judgment: Judgment normal       Bc Ewing was seen today for physical exam     Diagnoses and all orders for this visit:    Annual physical exam  Comments:  Bc Ewing is doing well  She is to continue a healthy, lower carb diet, and get back to regular exercise  Anxiety and depression  Comments:  Doing well on Escitalopram - continues Therapy sessions as well            [address] C[address]

## 2022-01-05 NOTE — PATIENT INSTRUCTIONS

## 2022-03-10 ENCOUNTER — APPOINTMENT (EMERGENCY)
Dept: RADIOLOGY | Facility: HOSPITAL | Age: 32
End: 2022-03-10
Payer: COMMERCIAL

## 2022-03-10 ENCOUNTER — HOSPITAL ENCOUNTER (EMERGENCY)
Facility: HOSPITAL | Age: 32
Discharge: HOME | End: 2022-03-10
Attending: EMERGENCY MEDICINE
Payer: COMMERCIAL

## 2022-03-10 VITALS
RESPIRATION RATE: 18 BRPM | OXYGEN SATURATION: 98 % | HEART RATE: 97 BPM | TEMPERATURE: 97.7 F | DIASTOLIC BLOOD PRESSURE: 91 MMHG | SYSTOLIC BLOOD PRESSURE: 137 MMHG

## 2022-03-10 DIAGNOSIS — S43.014A ANTERIOR SHOULDER DISLOCATION, RIGHT, INITIAL ENCOUNTER: Primary | ICD-10-CM

## 2022-03-10 PROCEDURE — 99285 EMERGENCY DEPT VISIT HI MDM: CPT | Performed by: PHYSICIAN ASSISTANT

## 2022-03-10 PROCEDURE — 73030 X-RAY EXAM OF SHOULDER: CPT

## 2022-03-10 PROCEDURE — 73060 X-RAY EXAM OF HUMERUS: CPT

## 2022-03-10 PROCEDURE — 96376 TX/PRO/DX INJ SAME DRUG ADON: CPT

## 2022-03-10 PROCEDURE — 99283 EMERGENCY DEPT VISIT LOW MDM: CPT

## 2022-03-10 PROCEDURE — 73110 X-RAY EXAM OF WRIST: CPT

## 2022-03-10 PROCEDURE — 96374 THER/PROPH/DIAG INJ IV PUSH: CPT

## 2022-03-10 PROCEDURE — 96372 THER/PROPH/DIAG INJ SC/IM: CPT

## 2022-03-10 PROCEDURE — 23650 CLTX SHO DSLC W/MNPJ WO ANES: CPT | Performed by: PHYSICIAN ASSISTANT

## 2022-03-10 RX ORDER — METHOCARBAMOL 500 MG/1
500 TABLET, FILM COATED ORAL 2 TIMES DAILY
Qty: 20 TABLET | Refills: 0 | Status: SHIPPED | OUTPATIENT
Start: 2022-03-10

## 2022-03-10 RX ORDER — FENTANYL CITRATE 50 UG/ML
50 INJECTION, SOLUTION INTRAMUSCULAR; INTRAVENOUS ONCE
Status: COMPLETED | OUTPATIENT
Start: 2022-03-10 | End: 2022-03-10

## 2022-03-10 RX ORDER — NAPROXEN 500 MG/1
500 TABLET ORAL 2 TIMES DAILY WITH MEALS
Qty: 30 TABLET | Refills: 0 | Status: SHIPPED | OUTPATIENT
Start: 2022-03-10

## 2022-03-10 RX ORDER — KETOROLAC TROMETHAMINE 30 MG/ML
15 INJECTION, SOLUTION INTRAMUSCULAR; INTRAVENOUS ONCE
Status: COMPLETED | OUTPATIENT
Start: 2022-03-10 | End: 2022-03-10

## 2022-03-10 RX ADMIN — FENTANYL CITRATE 50 MCG: 50 INJECTION INTRAMUSCULAR; INTRAVENOUS at 10:21

## 2022-03-10 RX ADMIN — FENTANYL CITRATE 50 MCG: 50 INJECTION INTRAMUSCULAR; INTRAVENOUS at 09:35

## 2022-03-10 RX ADMIN — KETOROLAC TROMETHAMINE 15 MG: 30 INJECTION, SOLUTION INTRAMUSCULAR at 09:00

## 2022-03-10 NOTE — ED PROVIDER NOTES
History  Chief Complaint   Patient presents with    Shoulder Injury     pt reports slipping on ice this morning, c/o R shoulder pain  Patient is a 64-year-old female presenting to the ED for evaluation of a right shoulder injury  Patient states that she slipped on the ice and fell on an outstretched arm  She is complaining of pain/deformity of the right shoulder as well as right wrist pain  She denies any numbness/weakness of extremity  She denies any head strike or loss of consciousness  She is not on any blood thinners or aspirin  She denies any other injuries  Prior to Admission Medications   Prescriptions Last Dose Informant Patient Reported? Taking? ALPRAZolam (XANAX) 0 25 mg tablet  Self Yes No   Sig: Take by mouth as needed for anxiety   albuterol (PROVENTIL HFA,VENTOLIN HFA) 90 mcg/act inhaler   Yes No   Sig: Inhale 2 puffs every 6 (six) hours as needed   escitalopram (Lexapro) 10 mg tablet   No No   Sig: Take 1 tablet (10 mg total) by mouth daily   methylphenidate (RITALIN) 10 mg tablet  Self Yes No   Sig: Take 10 mg by mouth as needed   methylphenidate (RITALIN) 5 mg tablet  Self Yes No   Sig: Take 5 mg by mouth as needed      Facility-Administered Medications: None       History reviewed  No pertinent past medical history  Past Surgical History:   Procedure Laterality Date    OTHER SURGICAL HISTORY      Oopherectomy       History reviewed  No pertinent family history  I have reviewed and agree with the history as documented  E-Cigarette/Vaping     E-Cigarette/Vaping Substances     Social History     Tobacco Use    Smoking status: Never Smoker    Smokeless tobacco: Never Used   Substance Use Topics    Alcohol use: Yes    Drug use: Not Currently       Review of Systems   Constitutional: Negative for appetite change, chills, fatigue and fever  HENT: Negative for congestion, rhinorrhea, sinus pressure, sinus pain and sore throat      Eyes: Negative for photophobia and visual disturbance  Respiratory: Negative for cough, shortness of breath and wheezing  Cardiovascular: Negative for chest pain, palpitations and leg swelling  Gastrointestinal: Negative for abdominal pain, blood in stool, constipation, diarrhea, nausea and vomiting  Genitourinary: Negative for difficulty urinating, dysuria, flank pain, frequency, hematuria and urgency  Musculoskeletal: Positive for arthralgias (right shoulder and wrist pain)  Negative for back pain, joint swelling, myalgias and neck pain  Neurological: Negative for dizziness, syncope, weakness, light-headedness and headaches  All other systems reviewed and are negative  Physical Exam  Physical Exam  Vitals and nursing note reviewed  Constitutional:       General: She is awake  Appearance: Normal appearance  She is well-developed  She is not toxic-appearing or diaphoretic  HENT:      Head: Normocephalic and atraumatic  Right Ear: External ear normal       Left Ear: External ear normal       Nose: Nose normal       Mouth/Throat:      Lips: Pink  Mouth: Mucous membranes are moist    Eyes:      General: Lids are normal  No scleral icterus  Conjunctiva/sclera: Conjunctivae normal       Pupils: Pupils are equal, round, and reactive to light  Cardiovascular:      Rate and Rhythm: Normal rate and regular rhythm  Pulses: Normal pulses  Radial pulses are 2+ on the right side and 2+ on the left side  Heart sounds: Normal heart sounds, S1 normal and S2 normal    Pulmonary:      Effort: Pulmonary effort is normal  No accessory muscle usage  Breath sounds: Normal breath sounds  No stridor  No decreased breath sounds, wheezing, rhonchi or rales  Abdominal:      General: Abdomen is flat  Bowel sounds are normal  There is no distension  Palpations: Abdomen is soft  Tenderness: There is no abdominal tenderness  There is no right CVA tenderness, left CVA tenderness, guarding or rebound  Musculoskeletal:        Arms:       Cervical back: Full passive range of motion without pain and neck supple  No signs of trauma  No pain with movement  Right lower leg: No edema  Left lower leg: No edema  Lymphadenopathy:      Cervical: No cervical adenopathy  Skin:     General: Skin is warm and dry  Capillary Refill: Capillary refill takes less than 2 seconds  Coloration: Skin is not cyanotic, jaundiced or pale  Neurological:      Mental Status: She is alert and oriented to person, place, and time  GCS: GCS eye subscore is 4  GCS verbal subscore is 5  GCS motor subscore is 6  Gait: Gait normal    Psychiatric:         Mood and Affect: Mood normal          Speech: Speech normal          Behavior: Behavior is cooperative  Vital Signs  ED Triage Vitals   Temperature Pulse Respirations Blood Pressure SpO2   03/10/22 0815 03/10/22 0814 03/10/22 0814 03/10/22 0814 03/10/22 0815   97 7 °F (36 5 °C) 97 18 137/91 98 %      Temp Source Heart Rate Source Patient Position - Orthostatic VS BP Location FiO2 (%)   03/10/22 0815 03/10/22 0814 03/10/22 0814 03/10/22 0814 --   Oral Monitor Sitting Left arm       Pain Score       03/10/22 0900       6           Vitals:    03/10/22 0814   BP: 137/91   Pulse: 97   Patient Position - Orthostatic VS: Sitting         Visual Acuity      ED Medications  Medications   ketorolac (TORADOL) injection 15 mg (15 mg Intramuscular Given 3/10/22 0900)   fentanyl citrate (PF) 100 MCG/2ML 50 mcg (50 mcg Intravenous Given 3/10/22 0935)   fentanyl citrate (PF) 100 MCG/2ML 50 mcg (50 mcg Intravenous Given 3/10/22 1021)       Diagnostic Studies  Results Reviewed     None                 XR shoulder 2+ views RIGHT   Final Result by Bart King MD (03/10 9504)      Interval relocation of the right humerus  No acute fracture              Workstation performed: [id number]         XR shoulder 2+ views RIGHT   Final Result by Zuleyma Perales DO (03/10 8166) Anterior shoulder dislocation  No fracture  As per comments in EPIC, findings are concordant with preliminary interpretation provided by the emergency department  Workstation performed: [id number]         XR humerus RIGHT   Final Result by Dora Machado DO (03/10 4018)      No fracture  Anterior right shoulder dislocation  Workstation performed: [id number]         XR wrist 3+ views RIGHT   Final Result by Dora Machado DO (03/10 7180)      No acute osseous abnormality  Workstation performed: [id number]                    Procedures  Orthopedic injury treatment    Date/Time: 3/10/2022 9:08 AM  Performed by: Sonja Gonzalez PA-C  Authorized by: Sonja Gonzalez PA-C     Patient Location:  ED  Other Assisting Provider: Yes (comment)    Verbal consent obtained?: Yes    Written consent obtained?: Yes    Risks and benefits: Risks, benefits and alternatives were discussed    Consent given by:  Patient  Patient states understanding of procedure being performed: Yes    Patient's understanding of procedure matches consent: Yes    Procedure consent matches procedure scheduled: Yes    Relevant documents present and verified: Yes    Test results available and properly labeled: Yes    Site marked: Yes    Radiology Images displayed and confirmed   If images not available, report reviewed: Yes    Required items: Required blood products, implants, devices and special equipment available    Patient identity confirmed:  Verbally with patient and arm band  Time out: Immediately prior to the procedure a time out was called    Injury location:  Shoulder  Location details:  Right shoulder  Injury type:  Dislocation  Dislocation type: anterior    Chronicity:  New  Hill-Sachs deformity?: No    Neurovascular status: Neurovascularly intact    Distal perfusion: normal    Neurological function: normal    Range of motion: normal    Manipulation performed?: Yes    Reduction method: Cunningham technique  Reduction method: Cunningham technique  Reduction method: Cunningham technique  Reduction method: Cunningham technique  Reduction method: Cunningham technique  Reduction method: Cunningham technique  Reduction successful?: Yes    Confirmation: Reduction confirmed by x-ray    Immobilization:  Sling  Neurovascular status: Neurovascularly intact    Distal perfusion: normal    Neurological function: normal    Range of motion: normal    Patient tolerance:  Patient tolerated the procedure well with no immediate complications             ED Course  ED Course as of 03/10/22 1748   Thu Mar 10, 2022   0913 Right anterior shoulder dislocation on x-ray  [facility]  Number of Diagnoses or Management Options  Anterior shoulder dislocation, right, initial encounter  Diagnosis management comments: Patient is a 67-year-old female presenting to the ED for evaluation of a right shoulder injury  X-ray showed a right anterior shoulder dislocation  Patient given fentanyl and shoulder was reduced using the Kaplice 1 technique  Reduction confirmed with x-ray  Patient tolerated procedure well and was placed in a sling  Orthopedic follow-up information provided  The management plan was discussed in detail with the patient at bedside and all questions were answered  Prior to discharge, verbal and written instructions provided  ED return precautions discussed in detail  The patient verbalized understanding of our discussion and plan of care, and agrees to return to the Emergency Department for concerns and progression of illness         Amount and/or Complexity of Data Reviewed  Tests in the radiology section of CPT®: ordered and reviewed    Patient Progress  Patient progress: stable      Disposition  Final diagnoses:   Anterior shoulder dislocation, right, initial encounter     Time reflects when diagnosis was documented in both MDM as applicable and the Disposition within this note     Time User Action Codes Description Comment    3/10/2022 10:24 AM Jaguar Harvey Add [Q20 609R] Anterior shoulder dislocation, right, initial encounter       ED Disposition     ED Disposition Condition Date/Time Comment    Discharge Stable Thu Mar 10, 2022 10:24 AM Orquidea Chaudhari discharge to home/self care              Follow-up Information     Follow up With Specialties Details Why Contact Info Additional [address] [facility] [place] [facility] Schedule an appointment as soon as possible for a visit   Edgar [address] [phone]  [address] [facility] [place], [address], [address], [place], [address] [facility] Emergency Department Emergency Medicine  If symptoms worsen [address] [facility] [address] [facility] Emergency Department, [address], [place], [place], [address]          Discharge Medication List as of 3/10/2022 10:25 AM      START taking these medications    Details   naproxen (Naprosyn) 500 mg tablet Take 1 tablet (500 mg total) by mouth 2 (two) times a day with meals, Starting Thu 3/10/2022, Normal         CONTINUE these medications which have NOT CHANGED    Details   albuterol (PROVENTIL HFA,VENTOLIN HFA) 90 mcg/act inhaler Inhale 2 puffs every 6 (six) hours as needed, Starting Tue 5/25/2021, Until Wed 5/25/2022 at 2359, Historical Med      ALPRAZolam (XANAX) 0 25 mg tablet Take by mouth as needed for anxiety, Historical Med      escitalopram (Lexapro) 10 mg tablet Take 1 tablet (10 mg total) by mouth daily, Starting Tue 10/12/2021, Normal      !! methylphenidate (RITALIN) 10 mg tablet Take 10 mg by mouth as needed, Historical Med      !! methylphenidate (RITALIN) 5 mg tablet Take 5 mg by mouth as needed, Historical Med       !! - Potential duplicate medications found  Please discuss with provider                PDMP Review       Value Time User    PDMP Reviewed  Yes 1/5/2022  7:18 AM Maranda Garcia DO          ED Provider  Electronically Signed by           Roscoe Sam PA-C  03/10/22 8258

## 2022-03-10 NOTE — ED ATTENDING ATTESTATION
3/10/2022  IAmerico DO, saw and evaluated the patient  I have discussed the patient with the resident/non-physician practitioner and agree with the resident's/non-physician practitioner's findings, Plan of Care, and MDM as documented in the resident's/non-physician practitioner's note, except where noted  All available labs and Radiology studies were reviewed  I was present for key portions of any procedure(s) performed by the resident/non-physician practitioner and I was immediately available to provide assistance  At this point I agree with the current assessment done in the Emergency Department  I have conducted an independent evaluation of this patient a history and physical is as follows:    32 yo F w/ slip on ice, fell w/ arm out, c/o right shoulder pain  No other injuries  On physical exam: pt is well appearing, in mild pain distress due to right shoulder  mucous membranes moist   CTA b/l , heart RRR  Abdomen NT, ND, no R/R/G  R shoulder: put sulcus palpated below Acromion, + anterior fullness Neuro intact, gcs 15  Cap refill < 2 sec, skin warm and dry  ED Course     R shoulder dislocation, reduced at bedside w/ Major Hutch technique w/ PA Paulina Zarate, tolerated well, sling placed      Critical Care Time  Procedures

## 2022-03-10 NOTE — Clinical Note
Neeraj Swan was seen and treated in our emergency department on 3/10/2022  No work until cleared by Family Doctor/Orthopedics        Diagnosis:     Sabina Pacheco    She may return on this date: If you have any questions or concerns, please don't hesitate to call        Christy Ty PA-C    ______________________________           _______________          _______________  Hospital Representative                              Date                                Time

## 2022-03-16 ENCOUNTER — OFFICE VISIT (OUTPATIENT)
Dept: OBGYN CLINIC | Facility: CLINIC | Age: 32
End: 2022-03-16
Payer: COMMERCIAL

## 2022-03-16 VITALS — BODY MASS INDEX: 30.19 KG/M2 | WEIGHT: 170.4 LBS | OXYGEN SATURATION: 98 % | HEART RATE: 87 BPM | HEIGHT: 63 IN

## 2022-03-16 DIAGNOSIS — S43.014A ANTERIOR SHOULDER DISLOCATION, RIGHT, INITIAL ENCOUNTER: ICD-10-CM

## 2022-03-16 PROCEDURE — 1036F TOBACCO NON-USER: CPT | Performed by: PHYSICIAN ASSISTANT

## 2022-03-16 PROCEDURE — 3008F BODY MASS INDEX DOCD: CPT | Performed by: PHYSICIAN ASSISTANT

## 2022-03-16 PROCEDURE — 99203 OFFICE O/P NEW LOW 30 MIN: CPT | Performed by: PHYSICIAN ASSISTANT

## 2022-03-16 NOTE — PROGRESS NOTES
Assessment/Plan   Diagnoses and all orders for this visit:    Anterior shoulder dislocation, right, initial encounter  - Reduced in ED  - Start PT  - Ice, Naproxen as needed  - Avoid external rotation, abduction (throwing motion) as discussed/demonstrated  - Follow up with Dr Brenton Farooq in 3-4 weeks          Subjective   Patient ID: Andrew Ramos is a 33 y o  female  Vitals:    03/16/22 1053   Pulse: 87   SpO2: 98%     30yo female comes in for an evaluation of her right shoulder  She was injured on 3-10-22 when she slipped on ice and fell onto her outstretched arm  Xrays in the ED demonstrated an anterior dislocation with no fracture  This was successfully reduced  The pain is dull in character, mild in severity, pain does not radiate and is not associated with numbness  She has never had a shoulder dislocation or other shoulder injury in the past         The following portions of the patient's history were reviewed and updated as appropriate: allergies, current medications, past family history, past medical history, past social history, past surgical history and problem list     Review of Systems  Ortho Exam  History reviewed  No pertinent past medical history  Past Surgical History:   Procedure Laterality Date    OTHER SURGICAL HISTORY      Oopherectomy     No family history on file  Social History     Occupational History    Not on file   Tobacco Use    Smoking status: Never Smoker    Smokeless tobacco: Never Used   Vaping Use    Vaping Use: Never used   Substance and Sexual Activity    Alcohol use: Yes    Drug use: Not Currently    Sexual activity: Not on file       Review of Systems   Constitutional: Negative  HENT: Negative  Eyes: Negative  Respiratory: Negative  Cardiovascular: Negative  Gastrointestinal: Negative  Endocrine: Negative  Genitourinary: Negative  Musculoskeletal: As below      Allergic/Immunologic: Negative  Neurological: Negative      Hematological: Negative  Psychiatric/Behavioral: Negative  Objective   Physical Exam      I have personally reviewed pertinent films in PACS and my interpretation is no acute displaced fracture  Successfully reduced on ED xray  · Constitutional: Awake, Alert, Oriented  · Eyes: EOMI  · Psych: Mood and affect appropriate  · Heart: regular rate   · Lungs: No audible wheezing  · Abdomen: No guarding  · Lymph: no lymphedema       right Shoulder:  - Appearance   No swelling, discoloration, deformity, or ecchymosis  - Palpation   No tenderness to palpation of the clavicle, AC joint, biceps tendon, scapula, or proximal humerus  - ROM   Flexion: 90 active, ER: 45 with elbow at side and IR: ASIS    Passive ROM not tested due to acute recent dislocation   - Motor   Median/ulnar/radial nerve motor intact  - Special tests   No sergeant patch numbness  - NVI distally same

## 2022-04-13 ENCOUNTER — OFFICE VISIT (OUTPATIENT)
Dept: OBGYN CLINIC | Facility: CLINIC | Age: 32
End: 2022-04-13
Payer: COMMERCIAL

## 2022-04-13 VITALS
BODY MASS INDEX: 29.95 KG/M2 | DIASTOLIC BLOOD PRESSURE: 68 MMHG | WEIGHT: 169 LBS | HEIGHT: 63 IN | SYSTOLIC BLOOD PRESSURE: 116 MMHG | OXYGEN SATURATION: 100 % | HEART RATE: 98 BPM

## 2022-04-13 DIAGNOSIS — S43.014A ANTERIOR SHOULDER DISLOCATION, RIGHT, INITIAL ENCOUNTER: Primary | ICD-10-CM

## 2022-04-13 PROCEDURE — 3008F BODY MASS INDEX DOCD: CPT | Performed by: ORTHOPAEDIC SURGERY

## 2022-04-13 PROCEDURE — 99214 OFFICE O/P EST MOD 30 MIN: CPT | Performed by: ORTHOPAEDIC SURGERY

## 2022-04-13 PROCEDURE — 1036F TOBACCO NON-USER: CPT | Performed by: ORTHOPAEDIC SURGERY

## 2022-04-13 NOTE — PROGRESS NOTES
[address] [facility] [address]  [phone]       Laura Triplett  [id number]  1990    ORTHOPAEDIC SURGERY OUTPATIENT NOTE  4/13/2022      HISTORY:  33 y o  female  presents with 4 week history right shoulder pain and discomfort  Patient had a right shoulder dislocation that was reduced in the emergency department  She is a physical therapist   She does states she has discomfort with the apprehension test   She has had no recurrent instability episodes since her injury  She states her pain has gotten better however it still is sore with certain motions  She is right-hand dominant  She has had no shoulder dislocations in the past prior to the injury  History reviewed  No pertinent past medical history  Past Surgical History:   Procedure Laterality Date    OTHER SURGICAL HISTORY      Oopherectomy       Social History     Socioeconomic History    Marital status: Single     Spouse name: Not on file    Number of children: Not on file    Years of education: Not on file    Highest education level: Not on file   Occupational History    Not on file   Tobacco Use    Smoking status: Never Smoker    Smokeless tobacco: Never Used   Vaping Use    Vaping Use: Never used   Substance and Sexual Activity    Alcohol use: Yes    Drug use: Not Currently    Sexual activity: Not on file   Other Topics Concern    Not on file   Social History Narrative    Not on file     Social Determinants of Health     Financial Resource Strain: Not on file   Food Insecurity: Not on file   Transportation Needs: Not on file   Physical Activity: Not on file   Stress: Not on file   Social Connections: Not on file   Intimate Partner Violence: Not on file   Housing Stability: Not on file       History reviewed  No pertinent family history       Patient's Medications   New Prescriptions    No medications on file   Previous Medications ALBUTEROL (PROVENTIL HFA,VENTOLIN HFA) 90 MCG/ACT INHALER    Inhale 2 puffs every 6 (six) hours as needed    ALPRAZOLAM (XANAX) 0 25 MG TABLET    Take by mouth as needed for anxiety    ESCITALOPRAM (LEXAPRO) 10 MG TABLET    Take 1 tablet (10 mg total) by mouth daily    METHOCARBAMOL (ROBAXIN) 500 MG TABLET    Take 1 tablet (500 mg total) by mouth 2 (two) times a day    METHYLPHENIDATE (RITALIN) 10 MG TABLET    Take 10 mg by mouth as needed    METHYLPHENIDATE (RITALIN) 5 MG TABLET    Take 5 mg by mouth as needed    NAPROXEN (NAPROSYN) 500 MG TABLET    Take 1 tablet (500 mg total) by mouth 2 (two) times a day with meals   Modified Medications    No medications on file   Discontinued Medications    No medications on file       Allergies   Allergen Reactions    Sulfamethoxazole-Trimethoprim Anaphylaxis     Other reaction(s): Facial Swelling          /68   Pulse 98   Ht 5' 3" (1 6 m)   Wt 76 7 kg (169 lb)   SpO2 100%   BMI 29 94 kg/m²      REVIEW OF SYSTEMS:  Constitutional: Negative  HEENT: Negative  Respiratory: Negative  Skin: Negative  Neurological: Negative  Psychiatric/Behavioral: Negative  Musculoskeletal: Negative except for that mentioned in the HPI      PHYSICAL EXAM:     LEFT SHOULDER:     NVI axillary/medial/radial/ulnar and sensory intact     Forward flexion:   180 degrees   Abduction:  180 degrees   External rotation at 90 degrees abduction:   90 degrees   Internal rotation at 90 degrees abduction:  90 degrees   External rotation at 0 degrees:   70 degrees   Internal rotation: T7     STRENGTH:  Forward flexion:  5/5   Abduction:  5/5   External rotation:  5/5   Internal rotation:  5/5        Speed test: Negative  Yergason's: Negative   Tender to palpation ACJ (acromioclavicular joint): Negative   Tender to palpation LHB (long head of biceps): Negative  Kapadia test: Negative  Vilas test: Negative  Hornblower's: Negative  Lift off: Negative  Belly press: Negative  Bear hug: Negative  External lag sign: Negative  Cross-body adduction: Negative  Sulcus sign: Negative  Jus's test: Negative  Drop arm test: negative     RIGHT SHOULDER:     NVI axillary/medial/radial/ulnar and sensory intact     Forward flexion:   180 degrees   Abduction:  180 degrees   External rotation at 90 degrees abduction:  90 degrees   Internal rotation at 90 degrees abduction:   90 degrees   External rotation at 0 degrees:  70 degrees   Internal rotation: T7      STRENGTH:  Forward flexion:  5/5   Abduction:  5/5   External rotation:  5/5   Internal rotation:  5/5     Speed test: Negative  Yergason's: Negative   Tender to palpation ACJ: Negative   Tender to palpation LHB: Negative  Kapadia test: Negative  Frewsburg's:  Positive  Hornblower's: Negative  Lift off: Negative  Belly press: Negative  Bear hug: Negative  External lag sign: Negative  Cross-body adduction: Negative  Sulcus sign: Negative  Jus's test: Negative  Drop arm test: Negative  Apprehension test: Positive     Reflexes:  Brachioradialis:  Symmetric bilaterally  Triceps: Symmetric bilaterally  Biceps: Symmetric bilaterally  Patella tendon: Symmetric bilaterally  Achilles tendon: Symmetric bilaterally  Babinski's: Negative  Susan sign: Negative     No scapular winging or dyskinesis     Capillary refill brisk   Full ROM of elbows bilaterally      Skin:  No ecchymosis/abrasion/erythema/warmth     Cervical spine:   Full rotation, side bending, flexion and extension without radiating pain  Spurling's: Negative    IMAGING:  X-ray right shoulder: There is no fracture subluxation or dislocation  ASSESSMENT AND PLAN: 33 y o  female  4 weeks status post closed reduction right shoulder dislocation  Given patient apprehensiveness I would like to obtain an MRI to further evaluate patient's labrum which is likely torn  If it is significant she will likely need surgical repair to prevent any future recurrent instability events    If it is unremarkable will get her into formal physical therapy

## 2022-04-18 DIAGNOSIS — F41.9 ANXIETY AND DEPRESSION: ICD-10-CM

## 2022-04-18 DIAGNOSIS — F32.A ANXIETY AND DEPRESSION: ICD-10-CM

## 2022-04-18 RX ORDER — ESCITALOPRAM OXALATE 10 MG/1
TABLET ORAL
Qty: 90 TABLET | Refills: 1 | Status: SHIPPED | OUTPATIENT
Start: 2022-04-18

## 2022-05-03 ENCOUNTER — TELEPHONE (OUTPATIENT)
Dept: OBGYN CLINIC | Facility: HOSPITAL | Age: 32
End: 2022-05-03

## 2022-05-03 NOTE — TELEPHONE ENCOUNTER
Patient sees Dr Seema Fisher from [facility] is calling to request MRI script  of r shoulder to be faxed      FAX: [phone]

## 2022-05-04 NOTE — NURSING NOTE
Attempted to contact patient to discuss upcoming right shoulder MRI arthrogram at [facility] Rad[facility]  Message left

## 2022-05-09 NOTE — NURSING NOTE
Attempted to contact patient again to discuss upcoming right shoulder MRI arthrogram at [facility] Radiology  Message left  Will await a call back

## 2022-05-11 ENCOUNTER — HOSPITAL ENCOUNTER (OUTPATIENT)
Dept: RADIOLOGY | Facility: HOSPITAL | Age: 32
Discharge: HOME | End: 2022-05-11
Attending: ORTHOPAEDIC SURGERY

## 2022-07-07 ENCOUNTER — RA CDI HCC (OUTPATIENT)
Dept: OTHER | Facility: HOSPITAL | Age: 32
End: 2022-07-07

## 2022-07-07 NOTE — PROGRESS NOTES
[facility]       Chart reviewed, no opportunity found: CHART REVIEWED, NO OPPORTUNITY FOUND        Patients Insurance        Commercial Insurance: [address] [place] [address]

## 2022-07-12 ENCOUNTER — TELEPHONE (OUTPATIENT)
Dept: ADMINISTRATIVE | Facility: OTHER | Age: 32
End: 2022-07-12

## 2022-07-12 ENCOUNTER — TELEPHONE (OUTPATIENT)
Dept: OTHER | Facility: OTHER | Age: 32
End: 2022-07-12

## 2022-07-12 NOTE — TELEPHONE ENCOUNTER
----- Message from Onofre Malone LPN sent at 1/78/7626  9:49 AM EDT -----  Regarding: care gap request  07/12/22 9:49 AM    Hello, our patient attached above has had HIV completed/performed  Please assist in updating the patient chart by pulling the Care Everywhere (CE) document  The date of service is 11/05/2015       Thank you,  AKUA Brady PG

## 2022-07-12 NOTE — PROGRESS NOTES
[place] [address]  [phone]       Reason for visit:   Chief Complaint   Patient presents with   • Annual Exam      HPI   Pretty Wood is a 28 y.o. female who presents with CPX   Updates No     Diet - Eats take out or restaurant foods nightly  Exercise - None    Smoke No   Alcohol Yes   Drugs No     Lives with BF  Work PT  Pap Smear Due Yes     Hep A Due Yes   TDAP Due No   Flu Due No   Lipids Due Yes   STDs Due No    Past Medical History:   Diagnosis Date   • ADHD    • Depression    • Hypothyroidism      Past Surgical History:   Procedure Laterality Date   • OOPHORECTOMY Left    • WISDOM TOOTH EXTRACTION       Social History     Social History   • Marital status: Other     Spouse name: N/A   • Number of children: N/A   • Years of education: N/A     Occupational History   • Not on file.     Social History Main Topics   • Smoking status: Never Smoker   • Smokeless tobacco: Never Used   • Alcohol use Yes      Comment: 5/week   • Drug use: No   • Sexual activity: Yes     Partners: Male     Birth control/ protection: Condom Male     Other Topics Concern   • Not on file     Social History Narrative    Do you wear your seatbelt? Yes    Do you have smoke detector in your home? Yes    Do you have a carbon monoxide detector in your home? 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 single         Family History   Problem Relation Age of Onset   • Hypertension Mother      Bactrim [sulfamethoxazole-trimethoprim]  Current Outpatient Prescriptions   Medication Sig Dispense Refill   • FLUoxetine (PROzac) 20 mg capsule Take 2 capsules (40 mg total) by mouth once daily. 180 capsule 1   • VYVANSE 30 mg capsule Take 30 mg by mouth every morning.  0     No current facility-administered medications for this visit.        Review of Systems   Constitutional: Negative for chills and fever.   HENT: Negative for rhinorrhea and sore  Consult Note    Subjective   Patient ID: Roc Wood is a 69year old male. No chief complaint on file. HISTORY OF PRESENT ILLNESS:  HPI    Current Outpatient Medications   Medication Sig Dispense Refill   â¢ rosuvastatin (CRESTOR) 20 MG tablet Take 1 tablet by mouth daily. 90 tablet 1   â¢ hydroCHLOROthiazide (HYDRODIURIL) 25 MG tablet Take 1 tablet by mouth daily. 90 tablet 1   â¢ lisinopril (ZESTRIL) 40 MG tablet Take 1 tablet by mouth daily. 90 tablet 1   â¢ sildenafil (REVATIO) 20 MG tablet Take 1 to 5 tablets by mouth 1 hour prior to sexual activity as needed     â¢ levothyroxine 200 MCG tablet TAKE 1 TABLET BY MOUTH DAILY 90 tablet 1   â¢ diclofenac (VOLTAREN) 0.1 % ophthalmic solution INSTILL 1 DROP IN LEFT EYE EVERY EVENING     â¢ oxybutynin (DITROPAN XL) 15 MG 24 hr tablet Take 15 mg by mouth daily. â¢ bromfenac 0.09 % ophthalmic solution INSTILL 1 DROP IN LEFT EYE EVERY EVENING     â¢ baclofen (LIORESAL) 10 MG tablet TAKE ONE TO TWO TABLETS BY MOUTH ONCE DAILY TO TWICE DAILY AS NEEDED     â¢ Multiple Vitamins-Minerals (MULTIVITAMIN ADULT PO)      â¢ aspirin 81 MG tablet Take 81 mg by mouth daily. States following dr lopez's instructions for all pills, takes 2     â¢ metFORMIN (GLUCOPHAGE) 500 MG tablet Take 500 mg by mouth 2 times daily (with meals). No current facility-administered medications for this visit.      ALLERGIES:  No Known Allergies  Past Medical History:   Diagnosis Date   â¢ Arthritis     low back   â¢ Back pain    â¢ Calculus of kidney    â¢ Cancer of kidney (CMS/HCC)    â¢ Cataract, nuclear sclerotic, both eyes    â¢ Constipation    â¢ Cyst of kidney, acquired    â¢ Diabetes mellitus, type II (CMS/HCC)    â¢ Essential (primary) hypertension    â¢ Herniated lumbar intervertebral disc    â¢ High cholesterol    â¢ Lattice degeneration     right eye   â¢ Lung nodule    â¢ Macular drusen    â¢ Malignant neoplasm of right kidney, except renal pelvis (CMS/HCC) 09/10/2020    oct 2019   â¢ Obesity    â¢ PVD (posterior "throat.    Respiratory: Negative for cough and shortness of breath.    Cardiovascular: Negative for chest pain and palpitations.   Gastrointestinal: Negative for abdominal pain, constipation, diarrhea, nausea and vomiting.   Genitourinary: Negative for vaginal bleeding and vaginal discharge.   Musculoskeletal: Negative for arthralgias and back pain.   Skin: Negative for rash.   Neurological: Negative for dizziness, weakness and numbness.     Objective   Vitals:    08/07/18 0914   BP: 114/86   BP Location: Left upper arm   Patient Position: Sitting   Pulse: 91   Temp: 36.8 °C (98.3 °F)   TempSrc: Oral   SpO2: 99%   Weight: 69.4 kg (153 lb)   Height: 1.575 m (5' 2\")       Physical Exam   Constitutional: She is oriented to person, place, and time. She appears well-developed and well-nourished.   HENT:   Head: Normocephalic and atraumatic.   Right Ear: External ear normal.   Left Ear: External ear normal.   Mouth/Throat: No oropharyngeal exudate.   Eyes: Conjunctivae are normal. Pupils are equal, round, and reactive to light.   Neck: Normal range of motion. Neck supple. No thyromegaly present.   Cardiovascular: Normal rate, regular rhythm, normal heart sounds and intact distal pulses.    Pulmonary/Chest: Effort normal and breath sounds normal. She has no wheezes. She has no rales.   Abdominal: Soft. Bowel sounds are normal. There is no tenderness. There is no rebound and no guarding.   Musculoskeletal: Normal range of motion. She exhibits no edema.   Lymphadenopathy:     She has no cervical adenopathy.   Neurological: She is alert and oriented to person, place, and time. No cranial nerve deficit.   Skin: Skin is warm. Capillary refill takes less than 2 seconds.   Psychiatric: She has a normal mood and affect. Her behavior is normal.   Vitals reviewed.      Procedures    No results found for: WBC, HGB, HCT, PLT, CHOL, TRIG, HDL, LDLDIRECT, ALT, AST, NA, K, CL, CREATININE, BUN, CO2, TSH, PSA, INR, HGBA1C, MICROALBUR    " Assessment   Problem List Items Addressed This Visit     Hypothyroidism    Relevant Orders    TSH w reflex FT4      Other Visit Diagnoses     Encounter for general adult medical examination without abnormal findings    -  Primary    Healthy Female  No complaints  Denies FHX  Eats poorly  Limited exercise  1 partner - condoms  Labs  TSH  HIV/RPR  Pap due    Relevant Orders    CBC and Differential    Comprehensive metabolic panel    Lipid panel    RPR    HIV 1,2 AB P24 AG              Heladio Raya MD  8/7/2018                                found.       Discussed treatment options, plan with patient and all questions answered. Also discussed her current diet and exercise requirements and encouraged healthy lifestyle to reduce potential for serious health complications        Follow up: No follow-ups on file.     Annie Michaud M.D., [facility]

## 2022-07-12 NOTE — TELEPHONE ENCOUNTER
Upon review of the In Basket request we were able to locate, review, and update the patient chart as requested for Hepatitis C  and HIV  Any additional questions or concerns should be emailed to the Practice Liaisons via [email or web address] email, please do not reply via In Basket      Thank you  Ramy Grimes [id number]

## 2022-07-12 NOTE — TELEPHONE ENCOUNTER
----- Message from Edelmira Bennett LPN sent at 9/74/2971  9:48 AM EDT -----  Regarding: care gap request  07/12/22 9:48 AM    Hello, our patient attached above has had Hepatitis C completed/performed  Please assist in updating the patient chart by pulling the Care Everywhere (CE) document  The date of service is 11/05/2015       Thank you,  Phares Freed, LPN [facility]

## 2022-07-12 NOTE — TELEPHONE ENCOUNTER
----- Message from Valdez Prieto LPN sent at 1/80/7595  9:51 AM EDT -----  Regarding: care gap request  07/12/22 9:51 AM    Hello, our patient attached above has had Pap Smear (HPV) aka Cervical Cancer Screening completed/performed  Please assist in updating the patient chart by pulling the Care Everywhere (CE) document  The date of service is 03/05/2020       Thank you,  Valdez Prieto LPN  [facility]

## 2022-07-18 NOTE — TELEPHONE ENCOUNTER
Upon review of the In Basket request we have found/obtained the documentation  After careful review of the document we are unable to complete this request for Pap Smear (HPV) aka Cervical Cancer Screening because the documentation is considered an External Result  The documentation found is not a copy of an original  We require the original document to close the gap(s)  Any additional questions or concerns should be emailed to the Practice Liaisons via [email or web address] email, please do not reply via In Basket      Thank you  Keyana Elizabeth

## 2022-07-31 ENCOUNTER — TELEPHONE (OUTPATIENT)
Dept: OTHER | Facility: OTHER | Age: 32
End: 2022-07-31

## 2022-10-26 DIAGNOSIS — F41.9 ANXIETY AND DEPRESSION: ICD-10-CM

## 2022-10-26 DIAGNOSIS — F32.A ANXIETY AND DEPRESSION: ICD-10-CM

## 2022-10-26 RX ORDER — ESCITALOPRAM OXALATE 10 MG/1
TABLET ORAL
Qty: 30 TABLET | Refills: 5 | Status: SHIPPED | OUTPATIENT
Start: 2022-10-26

## 2022-11-30 ENCOUNTER — RA CDI HCC (OUTPATIENT)
Dept: OTHER | Facility: HOSPITAL | Age: 32
End: 2022-11-30

## 2022-11-30 NOTE — PROGRESS NOTES
[facility]       Chart reviewed, no opportunity found: CHART REVIEWED, NO OPPORTUNITY FOUND        Patients Insurance        Commercial Insurance: [address] [place] [address]

## 2022-12-08 ENCOUNTER — OFFICE VISIT (OUTPATIENT)
Dept: FAMILY MEDICINE CLINIC | Facility: CLINIC | Age: 32
End: 2022-12-08

## 2022-12-08 VITALS
BODY MASS INDEX: 28.56 KG/M2 | HEART RATE: 86 BPM | WEIGHT: 161.2 LBS | SYSTOLIC BLOOD PRESSURE: 112 MMHG | DIASTOLIC BLOOD PRESSURE: 70 MMHG | RESPIRATION RATE: 16 BRPM | OXYGEN SATURATION: 99 % | TEMPERATURE: 98.7 F | HEIGHT: 63 IN

## 2022-12-08 DIAGNOSIS — N64.4 MASTALGIA: Primary | ICD-10-CM

## 2022-12-08 PROBLEM — IMO0002 POSITIVE TEST FOR HUMAN PAPILLOMAVIRUS (HPV): Status: ACTIVE | Noted: 2018-08-07

## 2022-12-08 PROBLEM — J45.909 ASTHMA: Status: ACTIVE | Noted: 2017-04-11

## 2022-12-08 PROBLEM — N83.209 CYST OF OVARY: Status: ACTIVE | Noted: 2018-08-07

## 2022-12-08 PROBLEM — F90.0 ATTENTION DEFICIT HYPERACTIVITY DISORDER (ADHD), PREDOMINANTLY INATTENTIVE TYPE: Status: ACTIVE | Noted: 2020-05-27

## 2022-12-08 PROBLEM — F41.9 ANXIETY AND DEPRESSION: Status: ACTIVE | Noted: 2017-04-11

## 2022-12-08 PROBLEM — F32.A ANXIETY AND DEPRESSION: Status: ACTIVE | Noted: 2017-04-11

## 2022-12-08 PROBLEM — Z79.899 CONTROLLED SUBSTANCE AGREEMENT SIGNED: Status: ACTIVE | Noted: 2020-05-27

## 2022-12-08 PROBLEM — R68.82 REDUCED LIBIDO: Status: ACTIVE | Noted: 2020-05-27

## 2022-12-08 NOTE — ASSESSMENT & PLAN NOTE
Right breast pain x 6-8 weeks  Pt is not nursing and denies rash or injuries  2nd degree relative w/ breast cancer  LMP 11/28  Fribrous tissue in the axillary tail and 9-11 o'clock position  Breast U/S ordered

## 2022-12-08 NOTE — PROGRESS NOTES
Name: Javier Lynn      : 1990      MRN: [id number]  Encounter Provider: Kennedy George MD  Encounter Date: 2022   Encounter department: [place] [address]  Mastalgia  Assessment & Plan:  Right breast pain x 6-8 weeks  Pt is not nursing and denies rash or injuries  2nd degree relative w/ breast cancer  LMP   Fribrous tissue in the axillary tail and 9-11 o'clock position  Breast U/S ordered  Orders:  -     US breast right limited (diagnostic); Future; Expected date: 2022           Subjective      Presents with right breast pain for 6-8 weeks  Pain intermittent and worse when touched  Tissue feels more firm in the area  Not breast feeding  Last period was 22  Denies nipple discharge , rash, injuries, heavy lifting, or previous abnormal mammo/ US  Review of Systems   Cardiovascular: Positive for chest pain (right breast pain )  Skin: Negative for color change and rash  Current Outpatient Medications on File Prior to Visit   Medication Sig   • ALPRAZolam (XANAX) 0 25 mg tablet Take by mouth as needed for anxiety   • escitalopram (LEXAPRO) 10 mg tablet TAKE 1 TABLET BY MOUTH EVERY DAY   • methylphenidate (RITALIN) 10 mg tablet Take 10 mg by mouth as needed   • methylphenidate (RITALIN) 5 mg tablet Take 5 mg by mouth as needed   • [DISCONTINUED] methocarbamol (ROBAXIN) 500 mg tablet Take 1 tablet (500 mg total) by mouth 2 (two) times a day (Patient not taking: Reported on 2022)   • [DISCONTINUED] naproxen (Naprosyn) 500 mg tablet Take 1 tablet (500 mg total) by mouth 2 (two) times a day with meals (Patient not taking: Reported on 2022)       Objective     /70   Pulse 86   Temp 98 7 °F (37 1 °C)   Resp 16   Ht 5' 3" (1 6 m)   Wt 73 1 kg (161 lb 3 2 oz)   SpO2 99%   BMI 28 56 kg/m²     Physical Exam  Constitutional:       General: She is not in acute distress  Appearance: Normal appearance   She is not ill-appearing  HENT:      Head: Normocephalic and atraumatic  Eyes:      Extraocular Movements: Extraocular movements intact  Chest:      Chest wall: Tenderness present  No lacerations, deformity or swelling  Breasts:     Right: Tenderness present  No inverted nipple, mass, nipple discharge or skin change  Comments: Fibrous tissue  Lymphadenopathy:      Upper Body:      Right upper body: No supraclavicular, axillary or pectoral adenopathy  Skin:     Findings: No bruising, erythema or rash  Neurological:      Mental Status: She is alert  Psychiatric:         Mood and Affect: Mood normal          Behavior: Behavior normal          Thought Content:  Thought content normal          Judgment: Judgment normal        Rad Gauthier MD

## 2023-01-25 ENCOUNTER — HOSPITAL ENCOUNTER (OUTPATIENT)
Dept: MAMMOGRAPHY | Facility: CLINIC | Age: 33
Discharge: HOME | End: 2023-01-25

## 2023-01-25 ENCOUNTER — HOSPITAL ENCOUNTER (OUTPATIENT)
Dept: ULTRASOUND IMAGING | Facility: CLINIC | Age: 33
Discharge: HOME | End: 2023-01-25

## 2023-01-25 VITALS — HEIGHT: 62 IN | WEIGHT: 161.16 LBS | BODY MASS INDEX: 29.66 KG/M2

## 2023-01-25 DIAGNOSIS — N64.4 MASTALGIA: ICD-10-CM

## 2023-06-08 DIAGNOSIS — F41.9 ANXIETY AND DEPRESSION: ICD-10-CM

## 2023-06-08 DIAGNOSIS — F32.A ANXIETY AND DEPRESSION: ICD-10-CM

## 2023-06-08 RX ORDER — ESCITALOPRAM OXALATE 10 MG/1
TABLET ORAL
Qty: 30 TABLET | Refills: 5 | Status: SHIPPED | OUTPATIENT
Start: 2023-06-08

## 2023-07-10 DIAGNOSIS — F41.9 ANXIETY AND DEPRESSION: ICD-10-CM

## 2023-07-10 DIAGNOSIS — F32.A ANXIETY AND DEPRESSION: ICD-10-CM

## 2023-07-11 RX ORDER — ESCITALOPRAM OXALATE 10 MG/1
TABLET ORAL
Qty: 90 TABLET | Refills: 2 | Status: SHIPPED | OUTPATIENT
Start: 2023-07-11

## 2023-07-27 ENCOUNTER — RA CDI HCC (OUTPATIENT)
Dept: OTHER | Facility: HOSPITAL | Age: 33
End: 2023-07-27

## 2023-07-27 NOTE — PROGRESS NOTES
[address] [facility] coding opportunities          Chart Reviewed number of suggestions sent to Provider: 1     Patients Insurance        Commercial Insurance: Yosef Riley       [id number]: Unspecified asthma, uncomplicated [[id number]]    Found in active problem list - please review and assess and document per MEAT if applicable for 1973

## 2023-08-03 ENCOUNTER — OFFICE VISIT (OUTPATIENT)
Dept: FAMILY MEDICINE CLINIC | Facility: CLINIC | Age: 33
End: 2023-08-03
Payer: COMMERCIAL

## 2023-08-03 VITALS
HEART RATE: 86 BPM | WEIGHT: 172 LBS | DIASTOLIC BLOOD PRESSURE: 80 MMHG | RESPIRATION RATE: 16 BRPM | OXYGEN SATURATION: 99 % | TEMPERATURE: 96.8 F | HEIGHT: 62 IN | BODY MASS INDEX: 31.65 KG/M2 | SYSTOLIC BLOOD PRESSURE: 118 MMHG

## 2023-08-03 DIAGNOSIS — Z13.6 SCREENING FOR CARDIOVASCULAR CONDITION: ICD-10-CM

## 2023-08-03 DIAGNOSIS — E53.8 VITAMIN B12 DEFICIENCY: ICD-10-CM

## 2023-08-03 DIAGNOSIS — F32.A ANXIETY AND DEPRESSION: Primary | ICD-10-CM

## 2023-08-03 DIAGNOSIS — R53.83 OTHER FATIGUE: ICD-10-CM

## 2023-08-03 DIAGNOSIS — R05.2 SUBACUTE COUGH: ICD-10-CM

## 2023-08-03 DIAGNOSIS — Z13.1 SCREENING FOR DIABETES MELLITUS: ICD-10-CM

## 2023-08-03 DIAGNOSIS — E55.9 VITAMIN D DEFICIENCY: ICD-10-CM

## 2023-08-03 DIAGNOSIS — F41.9 ANXIETY AND DEPRESSION: Primary | ICD-10-CM

## 2023-08-03 PROCEDURE — 99214 OFFICE O/P EST MOD 30 MIN: CPT | Performed by: FAMILY MEDICINE

## 2023-08-03 NOTE — PROGRESS NOTES
FAMILY PRACTICE OFFICE VISIT       NAME: Lisa Chapman  AGE: 26 y.o. SEX: female       : 1990        MRN: [id number]    DATE: 8/3/2023  TIME: 9:46 AM    Assessment and Plan   1. Anxiety and depression  Comments:  stable on present management - no  changes to Escitalopram at this time. 2. Subacute cough  Comments:  Likely post-viral, secondary to post-nasal drip (cough reported worse, laying down at night). Pt to try OTC Zyrtec in the evenings x 2 weeks. 3. Other fatigue  Comments:  FBW ordered for future Wellness Exam.  Orders:  -     TSH, 3rd generation with Free T4 reflex; Future  -     CBC and differential; Future    4. Screening for diabetes mellitus  -     Comprehensive metabolic panel; Future    5. Screening for cardiovascular condition  -     Lipid Panel with Direct LDL reflex; Future    6. Vitamin D deficiency  Comments:  Hx of - check Vit D level. Orders:  -     Vitamin D 25 hydroxy; Future    7. Vitamin B12 deficiency  Comments:  Hx of - check B12 level. Orders:  -     CBC and differential; Future  -     Vitamin B12; Future         There are no Patient Instructions on file for this visit. Chief Complaint     Chief Complaint   Patient presents with   • Medication Management     Patient being seen for medication review       History of Present Illness   Lisa Chapman is a 26y.o.-year-old female who presents in f/u today - I last saw Jun Perez in 2022. Mood is very good still with Escitalopram.  Ongoing cough from URI 3+ weeks ago. No fevers. Was COV-19 negative at home by report. Review of Systems   Review of Systems   Constitutional: Negative for activity change and fever. HENT: Negative for sore throat. Respiratory: Positive for cough. Negative for shortness of breath. Psychiatric/Behavioral: Negative for dysphoric mood. The patient is not nervous/anxious.         Active Problem List     Patient Active Problem List   Diagnosis   • Mastalgia   • Asthma   • Attention deficit hyperactivity disorder (ADHD), predominantly inattentive type   • Controlled substance agreement signed   • Cyst of ovary   • Anxiety and depression   • Positive test for human papillomavirus (HPV)   • Reduced libido         Past Medical History:  History reviewed. No pertinent past medical history. Past Surgical History:  Past Surgical History:   Procedure Laterality Date   • OTHER SURGICAL HISTORY      Oopherectomy       Family History:  Family History   Problem Relation Age of Onset   • Breast cancer Paternal Aunt        Social History:  Social History     Socioeconomic History   • Marital status: Single     Spouse name: Not on file   • Number of children: Not on file   • Years of education: Not on file   • Highest education level: Not on file   Occupational History   • Not on file   Tobacco Use   • Smoking status: Never   • Smokeless tobacco: Never   Vaping Use   • Vaping Use: Never used   Substance and Sexual Activity   • Alcohol use: Yes   • Drug use: Not Currently   • Sexual activity: Not on file   Other Topics Concern   • Not on file   Social History Narrative   • Not on file     Social Determinants of Health     Financial Resource Strain: Not on file   Food Insecurity: Not on file   Transportation Needs: Not on file   Physical Activity: Not on file   Stress: Not on file   Social Connections: Not on file   Intimate Partner Violence: Not on file   Housing Stability: Not on file       Objective     Vitals:    08/03/23 0914   BP: 118/80   Pulse: 86   Resp: 16   Temp: (!) 96.8 °F (36 °C)   SpO2: 99%     Wt Readings from Last 3 Encounters:   08/03/23 78 kg (172 lb)   01/25/23 73.1 kg (161 lb 2.5 oz)   12/08/22 73.1 kg (161 lb 3.2 oz)       Physical Exam  Vitals and nursing note reviewed. Constitutional:       General: She is not in acute distress. Appearance: Normal appearance. She is not ill-appearing, toxic-appearing or diaphoretic. HENT:      Head: Normocephalic and atraumatic.       Right Ear: Tympanic membrane, ear canal and external ear normal.      Left Ear: Tympanic membrane, ear canal and external ear normal.      Mouth/Throat:      Mouth: Mucous membranes are moist.      Pharynx: Oropharynx is clear. No oropharyngeal exudate or posterior oropharyngeal erythema. Eyes:      General: No scleral icterus. Conjunctiva/sclera: Conjunctivae normal.   Cardiovascular:      Rate and Rhythm: Normal rate and regular rhythm. Heart sounds: Normal heart sounds. No murmur heard. No friction rub. No gallop. Pulmonary:      Effort: Pulmonary effort is normal. No respiratory distress. Breath sounds: Normal breath sounds. No stridor. No wheezing, rhonchi or rales. Musculoskeletal:      Cervical back: Normal range of motion and neck supple. No rigidity or tenderness. Lymphadenopathy:      Cervical: No cervical adenopathy. Neurological:      Mental Status: She is alert and oriented to person, place, and time. Psychiatric:         Mood and Affect: Mood normal.         Behavior: Behavior normal.         Thought Content:  Thought content normal.         Judgment: Judgment normal.         Pertinent Laboratory/Diagnostic Studies:  No results found for: "GLUCOSE", "BUN", "CREATININE", "CALCIUM", "NA", "K", "CO2", "CL"  No results found for: "ALT", "AST", "GGT", "ALKPHOS", "BILITOT"    No results found for: "WBC", "HGB", "HCT", "MCV", "PLT"    No results found for: "TSH"    No results found for: "CHOL"  No results found for: "TRIG"  No results found for: "HDL"  No results found for: "LDLCALC"  No results found for: "HGBA1C"    Results for orders placed or performed in visit on 07/12/22   Human Immunodeficiency Virus 1/2 Antigen / Antibody ( Fourth Generation) with Reflex Testing   Result Value Ref Range    HIV Screen Non-Reactive     HIV Confirmation Non-Reactive    Hepatitis C antibody   Result Value Ref Range    HEP C AB Negative        Orders Placed This Encounter   Procedures   • TSH, 3rd generation with Free T4 reflex   • CBC and differential   • Comprehensive metabolic panel   • Lipid Panel with Direct LDL reflex   • Vitamin D 25 hydroxy   • Vitamin B12       ALLERGIES:  Allergies   Allergen Reactions   • Sulfamethoxazole-Trimethoprim Anaphylaxis     (BACTRIM)Other reaction(s): Facial Swelling         Current Medications     Current Outpatient Medications   Medication Sig Dispense Refill   • ALPRAZolam (XANAX) 0.25 mg tablet Take by mouth as needed for anxiety     • escitalopram (LEXAPRO) 10 mg tablet TAKE 1 TABLET BY MOUTH EVERY DAY 90 tablet 2   • methylphenidate (RITALIN) 10 mg tablet Take 10 mg by mouth as needed     • methylphenidate (RITALIN) 5 mg tablet Take 5 mg by mouth as needed       No current facility-administered medications for this visit.          Health Maintenance     Health Maintenance   Topic Date Due   • Pneumococcal Vaccine: Pediatrics (0 to 5 Years) and At-Risk Patients (6 to 59 Years) (1 - PCV) Never done   • COVID-19 Vaccine (4 - Moderna series) 01/06/2022   • BMI: Followup Plan  01/05/2023   • Annual Physical  01/05/2023   • Influenza Vaccine (1) 09/01/2023   • BMI: Adult  08/03/2024   • Cervical Cancer Screening  03/05/2025   • DTaP,Tdap,and Td Vaccines (2 - Td or Tdap) 10/16/2025   • HIV Screening  Completed   • Hepatitis C Screening  Completed   • Meningococcal ACWY Vaccine  Completed   • HIB Vaccine  Aged Out   • IPV Vaccine  Aged Out   • Hepatitis A Vaccine  Aged Out   • HPV Vaccine  Aged Out     Immunization History   Administered Date(s) Administered   • COVID-19 MODERNA VACC 0.5 ML IM 01/08/2021, 02/05/2021, 11/11/2021   • Hep A, adult 08/07/2018   • INFLUENZA 10/23/2017, 10/09/2022   • Influenza Injectable, MDCK, Preservative Free, Quadrivalent, 0.5 mL 10/08/2019   • Influenza Quadrivalent Preservative Free 3 years and older IM 10/16/2015, 11/21/2016, 10/13/2020   • Meningococcal MCV4P 08/21/2006   • Tdap 10/16/2015   • Tuberculin Skin Test-PPD Intradermal 11/17/2015, 11/21/2016          [address] [facility], DO

## 2023-10-06 ENCOUNTER — OFFICE VISIT (OUTPATIENT)
Dept: FAMILY MEDICINE CLINIC | Facility: CLINIC | Age: 33
End: 2023-10-06
Payer: COMMERCIAL

## 2023-10-06 VITALS
HEIGHT: 62 IN | RESPIRATION RATE: 14 BRPM | DIASTOLIC BLOOD PRESSURE: 80 MMHG | OXYGEN SATURATION: 98 % | BODY MASS INDEX: 32.46 KG/M2 | HEART RATE: 80 BPM | TEMPERATURE: 96.7 F | SYSTOLIC BLOOD PRESSURE: 118 MMHG | WEIGHT: 176.4 LBS

## 2023-10-06 DIAGNOSIS — R29.818 SUSPECTED SLEEP APNEA: Primary | ICD-10-CM

## 2023-10-06 DIAGNOSIS — J40 BRONCHITIS: ICD-10-CM

## 2023-10-06 DIAGNOSIS — R05.8 POST-VIRAL COUGH SYNDROME: ICD-10-CM

## 2023-10-06 PROCEDURE — 99214 OFFICE O/P EST MOD 30 MIN: CPT | Performed by: FAMILY MEDICINE

## 2023-10-06 RX ORDER — BECLOMETHASONE DIPROPIONATE HFA 40 UG/1
2 AEROSOL, METERED RESPIRATORY (INHALATION) 2 TIMES DAILY
Qty: 10.6 G | Refills: 0 | Status: SHIPPED | OUTPATIENT
Start: 2023-10-06

## 2023-10-06 RX ORDER — FLUTICASONE PROPIONATE 50 MCG
1 SPRAY, SUSPENSION (ML) NASAL DAILY
Qty: 9.9 ML | Refills: 0 | Status: SHIPPED | OUTPATIENT
Start: 2023-10-06

## 2023-10-06 RX ORDER — ALBUTEROL SULFATE 90 UG/1
AEROSOL, METERED RESPIRATORY (INHALATION)
COMMUNITY
Start: 2023-08-08

## 2023-10-06 RX ORDER — AZITHROMYCIN 250 MG/1
TABLET, FILM COATED ORAL
Qty: 6 TABLET | Refills: 0 | Status: SHIPPED | OUTPATIENT
Start: 2023-10-06 | End: 2023-10-10

## 2023-10-06 NOTE — PROGRESS NOTES
Name: Ricci Montgomery      : 1990      MRN: [id number]  Encounter Provider: Ricardo Wiseman MD  Encounter Date: 10/6/2023   Encounter department: [facility]     1. Suspected sleep apnea  Comments: Witness apenic episodes and loud snoring. BMI 32. Home sleep study ordered  Orders:  -     Home Study; Future    2. Post-viral cough syndrome  Comments:  Treated w/ abx and steroids in Aug and has had a lingering cough since. Lungs clear. Afebrile. Poss bronchospasms vs bronchitis vs post viral cough. Start ICS BID x 2 weeks. Aware she should rinse afterwards. Also suggest Flonase. If symptoms persist, may start z pack. Orders:  -     fluticasone (FLONASE) 50 mcg/act nasal spray; 1 spray into each nostril daily  -     azithromycin (ZITHROMAX) 250 mg tablet; Take 2 tablets today then 1 tablet daily x 4 days  -     beclomethasone (Qvar RediHaler) 40 MCG/ACT inhaler; Inhale 2 puffs 2 (two) times a day Rinse mouth after use. 3. Bronchitis  -     fluticasone (FLONASE) 50 mcg/act nasal spray; 1 spray into each nostril daily  -     azithromycin (ZITHROMAX) 250 mg tablet; Take 2 tablets today then 1 tablet daily x 4 days  -     beclomethasone (Qvar RediHaler) 40 MCG/ACT inhaler; Inhale 2 puffs 2 (two) times a day Rinse mouth after use. Subjective      Here with a persistent cough. History of asthma and uses CISCO once a month   Was ill in Aug and diagnosed with " walking pneumonia". She was prescribed an abx and steroids   Symptoms resolved the following month   Since then, she's had a mild cough   Chest is congested and cough is productive  No fevers, GI symptoms, or sick contacts. Cough worse at night or in the am   Possible PND      Also concerned she may have RICH   Snores at night, so much that her partner leaves the room  She's also had witness apneic episodes  Contributes some of her sx to her weight.   Wasn't snoring as loudly when she weighed less Review of Systems    Current Outpatient Medications on File Prior to Visit   Medication Sig   • albuterol (PROVENTIL HFA,VENTOLIN HFA) 90 mcg/act inhaler TAKE 2 PUFFS BY MOUTH EVERY 4 TO 6 HOURS AS NEEDED FOR BREATHING   • ALPRAZolam (XANAX) 0.25 mg tablet Take by mouth as needed for anxiety   • escitalopram (LEXAPRO) 10 mg tablet TAKE 1 TABLET BY MOUTH EVERY DAY   • methylphenidate (RITALIN) 10 mg tablet Take 10 mg by mouth as needed   • methylphenidate (RITALIN) 5 mg tablet Take 5 mg by mouth as needed       Objective     /80 (BP Location: Left arm, Patient Position: Sitting, Cuff Size: Standard)   Pulse 80   Temp (!) 96.7 °F (35.9 °C) (Tympanic)   Resp 14   Ht 5' 2" (1.575 m)   Wt 80 kg (176 lb 6.4 oz)   SpO2 98%   BMI 32.26 kg/m²     Physical Exam  Vitals reviewed. Constitutional:       Appearance: Normal appearance. HENT:      Head: Normocephalic and atraumatic. Right Ear: Tympanic membrane normal.      Left Ear: Tympanic membrane normal.      Nose: Congestion present. Mouth/Throat:      Mouth: Mucous membranes are moist.      Pharynx: No oropharyngeal exudate or posterior oropharyngeal erythema. Eyes:      Extraocular Movements: Extraocular movements intact. Cardiovascular:      Rate and Rhythm: Normal rate and regular rhythm. Heart sounds: No murmur heard. Pulmonary:      Effort: No respiratory distress. Breath sounds: Normal breath sounds. No stridor. No wheezing, rhonchi or rales. Musculoskeletal:      Right lower leg: No edema. Left lower leg: No edema. Lymphadenopathy:      Cervical: No cervical adenopathy. Skin:     General: Skin is warm. Findings: No rash. Neurological:      Mental Status: She is alert and oriented to person, place, and time. Psychiatric:         Mood and Affect: Mood normal.         Behavior: Behavior normal.         Thought Content:  Thought content normal.       Rocio Rudolph MD

## 2023-10-11 ENCOUNTER — TELEPHONE (OUTPATIENT)
Dept: SLEEP CENTER | Facility: CLINIC | Age: 33
End: 2023-10-11

## 2023-10-11 NOTE — TELEPHONE ENCOUNTER
----- Message from Deric De La Garza MD sent at 10/10/2023  7:29 PM EDT -----  approved  ----- Message -----  From: Clemente Davila  Sent: 10/9/2023   8:50 AM EDT  To: Sleep Medicine [facility] Provider    This home sleep study needs approval.     If approved please sign and return to clerical pool. If denied please include reasons why. Also provide alternative testing if warranted. Please sign and return to clerical pool.

## 2023-11-01 DIAGNOSIS — J40 BRONCHITIS: ICD-10-CM

## 2023-11-01 DIAGNOSIS — R05.8 POST-VIRAL COUGH SYNDROME: ICD-10-CM

## 2023-11-01 RX ORDER — FLUTICASONE PROPIONATE 50 MCG
SPRAY, SUSPENSION (ML) NASAL
Qty: 48 ML | Refills: 1 | Status: SHIPPED | OUTPATIENT
Start: 2023-11-01

## 2023-11-28 ENCOUNTER — HOSPITAL ENCOUNTER (OUTPATIENT)
Dept: SLEEP CENTER | Facility: CLINIC | Age: 33
Discharge: HOME | End: 2023-11-28
Payer: COMMERCIAL

## 2023-11-28 DIAGNOSIS — R29.818 SUSPECTED SLEEP APNEA: ICD-10-CM

## 2023-11-28 PROCEDURE — G0399 HOME SLEEP TEST/TYPE 3 PORTA: HCPCS

## 2023-11-29 NOTE — PROGRESS NOTES
Home Sleep Study Documentation    HOME STUDY DEVICE: Noxturnal no                                           Yuliet G3 yes      Pre-Sleep Home Study:    Set-up and instructions performed by: ST    Technician performed demonstration for Patient: yes    Return demonstration performed by Patient: yes    Written instructions provided to Patient: yes    Patient signed consent form: yes        Post-Sleep Home Study:    Additional comments by Patient: none     Home Sleep Study Failed:no:    Failure reason: N/A    Reported or Detected: N/A    Scored by: Richard Stephen

## 2023-11-30 PROBLEM — G47.33 OSA (OBSTRUCTIVE SLEEP APNEA): Status: ACTIVE | Noted: 2023-11-30

## 2023-12-08 ENCOUNTER — TELEPHONE (OUTPATIENT)
Dept: SLEEP CENTER | Facility: CLINIC | Age: 33
End: 2023-12-08

## 2023-12-11 ENCOUNTER — TELEPHONE (OUTPATIENT)
Dept: FAMILY MEDICINE CLINIC | Facility: CLINIC | Age: 33
End: 2023-12-11

## 2023-12-11 DIAGNOSIS — G47.34 NOCTURNAL HYPOXIA: Primary | ICD-10-CM

## 2023-12-27 ENCOUNTER — TELEPHONE (OUTPATIENT)
Dept: SLEEP CENTER | Facility: CLINIC | Age: 33
End: 2023-12-27

## 2023-12-27 NOTE — TELEPHONE ENCOUNTER
----- Message from Souleymane Ly DO sent at 12/26/2023  1:08 PM EST -----  approved  ----- Message -----  From: Sara Hyatt  Sent: 12/26/2023   8:24 AM EST  To: Sleep Medicine [place] Provider    This diagnostic sleep study needs approval.     If approved please sign and return to clerical pool.    If denied please include reasons why.  Also provide alternative testing if warranted. Please sign and return to clerical pool.

## 2023-12-29 ENCOUNTER — HOSPITAL ENCOUNTER (OUTPATIENT)
Dept: SLEEP CENTER | Facility: CLINIC | Age: 33
Discharge: HOME | End: 2023-12-29
Payer: COMMERCIAL

## 2023-12-29 DIAGNOSIS — G47.34 NOCTURNAL HYPOXIA: ICD-10-CM

## 2023-12-29 PROCEDURE — 95810 POLYSOM 6/> YRS 4/> PARAM: CPT | Performed by: INTERNAL MEDICINE

## 2023-12-29 PROCEDURE — 95810 POLYSOM 6/> YRS 4/> PARAM: CPT

## 2023-12-30 NOTE — PROGRESS NOTES
Sleep Study Documentation    Pre-Sleep Study       Sleep testing procedure explained to patient:YES    Patient napped prior to study:NO    Caffeine:Dayshift worker after 12PM.  Caffeine use:YES- coffee  6 to 18 ounces and chocolate milk  8 ounces    Alcohol:Dayshift workers after 5PM: Alcohol use:NO    Typical day for patient:YES       Study Documentation    Sleep Study Indications: EDS    Sleep Study: Diagnostic   Snore:Moderate  Supplemental O2: no      Minimum SaO2 84.0  Baseline SaO2 93.0      EKG abnormalities: no     EEG abnormalities: no    Were abnormal behaviors in sleep observed:NO    Is Total Sleep Study Recording Time < 2 hours: N/A    Is Total Sleep Study Recording Time > 2 hours but study is incomplete: N/A    Is Total Sleep Study Recording Time 6 hours or more but sleep was not obtained: NO    Patient classification: employed       Post-Sleep Study    Medication used at bedtime or during sleep study:NO    Patient reports time it took to fall asleep:20 to 30 minutes    Patient reports waking up during study:3 or more times.  Patient reports returning to sleep without difficulty.    Patient reports sleeping 6 to 8 hours without dreaming.    Does the Patient feel this is a typical night of sleep:worse than usual    Patient rated sleepiness: Very sleepy or tired    PAP treatment:no.                   118

## 2024-01-01 PROBLEM — G47.9 SLEEP DISTURBANCE: Status: ACTIVE | Noted: 2024-01-01

## 2024-05-30 DIAGNOSIS — F41.9 ANXIETY AND DEPRESSION: ICD-10-CM

## 2024-05-30 DIAGNOSIS — F32.A ANXIETY AND DEPRESSION: ICD-10-CM

## 2024-05-31 RX ORDER — ESCITALOPRAM OXALATE 10 MG/1
TABLET ORAL
Qty: 30 TABLET | Refills: 8 | Status: SHIPPED | OUTPATIENT
Start: 2024-05-31

## 2024-07-04 DIAGNOSIS — F41.9 ANXIETY AND DEPRESSION: ICD-10-CM

## 2024-07-04 DIAGNOSIS — F32.A ANXIETY AND DEPRESSION: ICD-10-CM

## 2024-07-05 RX ORDER — ESCITALOPRAM OXALATE 10 MG/1
TABLET ORAL
Qty: 90 TABLET | Refills: 1 | Status: SHIPPED | OUTPATIENT
Start: 2024-07-05

## 2024-09-11 ENCOUNTER — OFFICE VISIT (OUTPATIENT)
Dept: FAMILY MEDICINE CLINIC | Facility: CLINIC | Age: 34
End: 2024-09-11
Payer: COMMERCIAL

## 2024-09-11 VITALS
SYSTOLIC BLOOD PRESSURE: 120 MMHG | OXYGEN SATURATION: 98 % | RESPIRATION RATE: 17 BRPM | BODY MASS INDEX: 32.2 KG/M2 | DIASTOLIC BLOOD PRESSURE: 86 MMHG | TEMPERATURE: 96.6 F | HEIGHT: 62 IN | HEART RATE: 79 BPM | WEIGHT: 175 LBS

## 2024-09-11 DIAGNOSIS — F41.9 ANXIETY AND DEPRESSION: ICD-10-CM

## 2024-09-11 DIAGNOSIS — Z00.00 ENCOUNTER FOR ANNUAL PHYSICAL EXAM: Primary | ICD-10-CM

## 2024-09-11 DIAGNOSIS — R29.818 SUSPECTED SLEEP APNEA: ICD-10-CM

## 2024-09-11 DIAGNOSIS — F90.0 ATTENTION DEFICIT HYPERACTIVITY DISORDER (ADHD), PREDOMINANTLY INATTENTIVE TYPE: ICD-10-CM

## 2024-09-11 DIAGNOSIS — F32.A ANXIETY AND DEPRESSION: ICD-10-CM

## 2024-09-11 DIAGNOSIS — J45.20 MILD INTERMITTENT ASTHMA, UNSPECIFIED WHETHER COMPLICATED: ICD-10-CM

## 2024-09-11 DIAGNOSIS — G47.34 NOCTURNAL HYPOXIA: ICD-10-CM

## 2024-09-11 PROCEDURE — 99395 PREV VISIT EST AGE 18-39: CPT | Performed by: FAMILY MEDICINE

## 2024-09-11 RX ORDER — ESCITALOPRAM OXALATE 10 MG/1
10 TABLET ORAL DAILY
Qty: 90 TABLET | Refills: 1 | Status: SHIPPED | OUTPATIENT
Start: 2024-09-11

## 2024-09-11 NOTE — ASSESSMENT & PLAN NOTE
Has been able to concentrate without the medication. Ritalin was taken 4 months ago. Will continue ritalin 10 mg prn.

## 2024-09-11 NOTE — ASSESSMENT & PLAN NOTE
Mood stable on Lexapro 10 mg daily. Refill provded    Orders:    escitalopram (LEXAPRO) 10 mg tablet; Take 1 tablet (10 mg total) by mouth daily

## 2024-09-11 NOTE — PROGRESS NOTES
Adult Annual Physical  Name: Danielle Resendiz      : 1990      MRN: [id number]  Encounter Provider: Flavia Mcarthur MD  Encounter Date: 2024   Encounter department: [facility]    Assessment & Plan  Encounter for annual physical exam  Unremarkable female exam. UTD for age appropriate screening. Mood stable. Is working on her diet. Snoring improves with weight loss. Encourage pt to continue to lose weight.        Anxiety and depression  Mood stable on Lexapro 10 mg daily. Refill provded    Orders:    escitalopram (LEXAPRO) 10 mg tablet; Take 1 tablet (10 mg total) by mouth daily    Nocturnal hypoxia         Mild intermittent asthma, unspecified whether complicated  Stable. Was prescribed Qvar during her last respiratory illness and developed palpitations. Medication discontinued and allergy list updated. Continue prn albuterol          Suspected sleep apnea  Sleep study r/o RICH. Nasal ICS and H2 blockers were recommended for nasal congestion. Pt also noted less snoring when she lost 10 lbs. Encourage weight loss       Attention deficit hyperactivity disorder (ADHD), predominantly inattentive type  Has been able to concentrate without the medication. Ritalin was taken 4 months ago. Will continue ritalin 10 mg prn.        Immunizations and preventive care screenings were discussed with patient today. Appropriate education was printed on patient's after visit summary.    Counseling:  Dental Health: discussed importance of regular tooth brushing, flossing, and dental visits.  Exercise: the importance of regular exercise/physical activity was discussed. Recommend exercise 3-5 times per week for at least 30 minutes.          History of Present Illness     Adult Annual Physical:  Patient presents for annual physical. New auntie to a baby boy  Able to focus more and has had to use ritalin in 4 months.     Diet and Physical Activity:  - Diet/Nutrition:. cut down and potatoes  - Exercise: walking.  2-3 x a week for 20 minutes cardio    Depression Screening:    - PHQ-9 Score: 0    General Health:  - Sleep:. 6-7 hours  - Hearing: normal hearing bilateral ears.  - Vision: wears glasses and no vision problems.  - Dental: regular dental visits. seen in [place]    /GYN Health:  - Follows with GYN: yes.   - Last menstrual cycle: 8/22/2024.   - History of STDs: no    Review of Systems  Medical History Reviewed by provider this encounter:       Past Medical History   History reviewed. No pertinent past medical history.  Past Surgical History:   Procedure Laterality Date    OTHER SURGICAL HISTORY      Oopherectomy     Family History   Problem Relation Age of Onset    Breast cancer Paternal Aunt      Current Outpatient Medications on File Prior to Visit   Medication Sig Dispense Refill    albuterol (PROVENTIL HFA,VENTOLIN HFA) 90 mcg/act inhaler TAKE 2 PUFFS BY MOUTH EVERY 4 TO 6 HOURS AS NEEDED FOR BREATHING      ALPRAZolam (XANAX) 0.25 mg tablet Take by mouth as needed for anxiety      methylphenidate (RITALIN) 10 mg tablet Take 10 mg by mouth as needed      [DISCONTINUED] escitalopram (LEXAPRO) 10 mg tablet TAKE 1 TABLET BY MOUTH EVERY DAY 90 tablet 1    [DISCONTINUED] methylphenidate (RITALIN) 5 mg tablet Take 5 mg by mouth as needed      [DISCONTINUED] beclomethasone (Qvar RediHaler) 40 MCG/ACT inhaler Inhale 2 puffs 2 (two) times a day Rinse mouth after use. (Patient not taking: Reported on 9/11/2024) 10.6 g 0    [DISCONTINUED] fluticasone (FLONASE) 50 mcg/act nasal spray SPRAY 1 SPRAY INTO EACH NOSTRIL EVERY DAY (Patient not taking: Reported on 9/11/2024) 48 mL 1     No current facility-administered medications on file prior to visit.     Allergies   Allergen Reactions    Sulfamethoxazole-Trimethoprim Anaphylaxis     (BACTRIM)Other reaction(s): Facial Swelling      Qvar [Beclomethasone] Palpitations      Current Outpatient Medications on File Prior to Visit   Medication Sig Dispense Refill    albuterol (PROVENTIL  "HFA,VENTOLIN HFA) 90 mcg/act inhaler TAKE 2 PUFFS BY MOUTH EVERY 4 TO 6 HOURS AS NEEDED FOR BREATHING      ALPRAZolam (XANAX) 0.25 mg tablet Take by mouth as needed for anxiety      methylphenidate (RITALIN) 10 mg tablet Take 10 mg by mouth as needed      [DISCONTINUED] escitalopram (LEXAPRO) 10 mg tablet TAKE 1 TABLET BY MOUTH EVERY DAY 90 tablet 1    [DISCONTINUED] methylphenidate (RITALIN) 5 mg tablet Take 5 mg by mouth as needed      [DISCONTINUED] beclomethasone (Qvar RediHaler) 40 MCG/ACT inhaler Inhale 2 puffs 2 (two) times a day Rinse mouth after use. (Patient not taking: Reported on 9/11/2024) 10.6 g 0    [DISCONTINUED] fluticasone (FLONASE) 50 mcg/act nasal spray SPRAY 1 SPRAY INTO EACH NOSTRIL EVERY DAY (Patient not taking: Reported on 9/11/2024) 48 mL 1     No current facility-administered medications on file prior to visit.      Social History     Tobacco Use    Smoking status: Never    Smokeless tobacco: Never   Vaping Use    Vaping status: Never Used   Substance and Sexual Activity    Alcohol use: Yes    Drug use: Not Currently    Sexual activity: Not on file       Objective     /86 (BP Location: Right arm, Patient Position: Sitting, Cuff Size: Standard)   Pulse 79   Temp (!) 96.6 °F (35.9 °C) (Tympanic)   Resp 17   Ht 5' 2\" (1.575 m)   Wt 79.4 kg (175 lb)   SpO2 98%   BMI 32.01 kg/m²     Physical Exam  Constitutional:       General: She is not in acute distress.     Appearance: Normal appearance. She is not ill-appearing.   HENT:      Head: Normocephalic and atraumatic.      Right Ear: Tympanic membrane normal.      Left Ear: Tympanic membrane normal.      Mouth/Throat:      Mouth: Mucous membranes are moist.   Eyes:      Extraocular Movements: Extraocular movements intact.   Cardiovascular:      Rate and Rhythm: Normal rate and regular rhythm.      Heart sounds: No murmur heard.  Pulmonary:      Effort: Pulmonary effort is normal. No respiratory distress.      Breath sounds: Normal " breath sounds. No stridor. No wheezing, rhonchi or rales.   Abdominal:      General: There is no distension.      Palpations: Abdomen is soft. There is no mass.      Tenderness: There is no abdominal tenderness. There is no guarding.      Hernia: No hernia is present.   Musculoskeletal:      Right lower leg: No edema.      Left lower leg: No edema.   Skin:     General: Skin is warm.   Neurological:      Mental Status: She is alert and oriented to person, place, and time.   Psychiatric:         Mood and Affect: Mood normal.         Behavior: Behavior normal.

## 2024-09-11 NOTE — ASSESSMENT & PLAN NOTE
Stable. Was prescribed Qvar during her last respiratory illness and developed palpitations. Medication discontinued and allergy list updated. Continue prn albuterol

## 2025-02-07 DIAGNOSIS — F41.9 ANXIETY AND DEPRESSION: ICD-10-CM

## 2025-02-07 DIAGNOSIS — F32.A ANXIETY AND DEPRESSION: ICD-10-CM

## 2025-02-07 RX ORDER — ESCITALOPRAM OXALATE 10 MG/1
10 TABLET ORAL DAILY
Qty: 90 TABLET | Refills: 2 | Status: SHIPPED | OUTPATIENT
Start: 2025-02-07

## 2025-04-14 ENCOUNTER — TELEPHONE (OUTPATIENT)
Age: 35
End: 2025-04-14

## 2025-04-14 DIAGNOSIS — Z13.6 SCREENING FOR CARDIOVASCULAR CONDITION: Primary | ICD-10-CM

## 2025-04-14 DIAGNOSIS — Z13.1 SCREENING FOR DIABETES MELLITUS: ICD-10-CM

## 2025-04-14 NOTE — TELEPHONE ENCOUNTER
Patient made aware you are out of the office. Requesting routine labs, none ordered on visit from 9/11/24

## 2025-04-14 NOTE — TELEPHONE ENCOUNTER
Patient returns office call stating routine lab orders she is requesting and no specialty labs necessary.

## 2025-04-14 NOTE — TELEPHONE ENCOUNTER
Patient sent MYC message requesting lab order post last AWV on 9/11/24. None were ordered. Last labs competed were 9/1/21. RN reached out to patient for possible lab destination; last completed at [facility] lab. Advised patient to call back to let us know if lab orders need to go to other lab than [facility]. Please follow up with patient for provider response for lab orders.

## 2025-05-03 LAB
ALBUMIN SERPL-MCNC: 4.3 G/DL (ref 3.9–4.9)
ALP SERPL-CCNC: 65 IU/L (ref 44–121)
ALT SERPL-CCNC: 14 IU/L (ref 0–32)
AST SERPL-CCNC: 16 IU/L (ref 0–40)
BASOPHILS # BLD AUTO: 0 X10E3/UL (ref 0–0.2)
BASOPHILS NFR BLD AUTO: 1 %
BILIRUB SERPL-MCNC: 0.4 MG/DL (ref 0–1.2)
BUN SERPL-MCNC: 13 MG/DL (ref 6–20)
BUN/CREAT SERPL: 17 (ref 9–23)
CALCIUM SERPL-MCNC: 9.3 MG/DL (ref 8.7–10.2)
CHLORIDE SERPL-SCNC: 106 MMOL/L (ref 96–106)
CHOLEST SERPL-MCNC: 221 MG/DL (ref 100–199)
CHOLEST/HDLC SERPL: 5.5 RATIO (ref 0–4.4)
CO2 SERPL-SCNC: 21 MMOL/L (ref 20–29)
CREAT SERPL-MCNC: 0.77 MG/DL (ref 0.57–1)
EGFR: 102 ML/MIN/1.73
EOSINOPHIL # BLD AUTO: 0.1 X10E3/UL (ref 0–0.4)
EOSINOPHIL NFR BLD AUTO: 1 %
ERYTHROCYTE [DISTWIDTH] IN BLOOD BY AUTOMATED COUNT: 12.4 % (ref 11.7–15.4)
GLOBULIN SER-MCNC: 2.1 G/DL (ref 1.5–4.5)
GLUCOSE SERPL-MCNC: 97 MG/DL (ref 70–99)
HCT VFR BLD AUTO: 43.3 % (ref 34–46.6)
HDLC SERPL-MCNC: 40 MG/DL
HGB BLD-MCNC: 14 G/DL (ref 11.1–15.9)
IMM GRANULOCYTES # BLD: 0 X10E3/UL (ref 0–0.1)
IMM GRANULOCYTES NFR BLD: 0 %
LDLC SERPL CALC-MCNC: 161 MG/DL (ref 0–99)
LYMPHOCYTES # BLD AUTO: 1.8 X10E3/UL (ref 0.7–3.1)
LYMPHOCYTES NFR BLD AUTO: 33 %
MCH RBC QN AUTO: 29 PG (ref 26.6–33)
MCHC RBC AUTO-ENTMCNC: 32.3 G/DL (ref 31.5–35.7)
MCV RBC AUTO: 90 FL (ref 79–97)
MICRODELETION SYND BLD/T FISH: NORMAL
MONOCYTES # BLD AUTO: 0.4 X10E3/UL (ref 0.1–0.9)
MONOCYTES NFR BLD AUTO: 7 %
NEUTROPHILS # BLD AUTO: 3.2 X10E3/UL (ref 1.4–7)
NEUTROPHILS NFR BLD AUTO: 58 %
PLATELET # BLD AUTO: 226 X10E3/UL (ref 150–450)
POTASSIUM SERPL-SCNC: 4.6 MMOL/L (ref 3.5–5.2)
PROT SERPL-MCNC: 6.4 G/DL (ref 6–8.5)
RBC # BLD AUTO: 4.82 X10E6/UL (ref 3.77–5.28)
SL AMB VLDL CHOLESTEROL CALC: 20 MG/DL (ref 5–40)
SODIUM SERPL-SCNC: 139 MMOL/L (ref 134–144)
TRIGL SERPL-MCNC: 113 MG/DL (ref 0–149)
WBC # BLD AUTO: 5.5 X10E3/UL (ref 3.4–10.8)

## 2025-05-26 ENCOUNTER — RESULTS FOLLOW-UP (OUTPATIENT)
Dept: FAMILY MEDICINE CLINIC | Facility: CLINIC | Age: 35
End: 2025-05-26